# Patient Record
Sex: FEMALE | Race: WHITE | Employment: UNEMPLOYED | URBAN - NONMETROPOLITAN AREA
[De-identification: names, ages, dates, MRNs, and addresses within clinical notes are randomized per-mention and may not be internally consistent; named-entity substitution may affect disease eponyms.]

---

## 2017-09-25 ENCOUNTER — HOSPITAL ENCOUNTER (OUTPATIENT)
Dept: GENERAL RADIOLOGY | Age: 52
Discharge: HOME | End: 2017-09-25
Payer: COMMERCIAL

## 2017-09-25 ENCOUNTER — HOSPITAL ENCOUNTER (OUTPATIENT)
Age: 52
Discharge: HOME | End: 2017-09-25
Payer: COMMERCIAL

## 2017-09-25 DIAGNOSIS — Z02.71 ENCOUNTER FOR DISABILITY DETERMINATION: ICD-10-CM

## 2017-09-25 PROCEDURE — 73502 X-RAY EXAM HIP UNI 2-3 VIEWS: CPT

## 2017-09-25 PROCEDURE — 73560 X-RAY EXAM OF KNEE 1 OR 2: CPT

## 2021-09-02 ENCOUNTER — HOSPITAL ENCOUNTER (EMERGENCY)
Age: 56
Discharge: HOME | End: 2021-09-02
Payer: COMMERCIAL

## 2021-09-02 VITALS
RESPIRATION RATE: 16 BRPM | BODY MASS INDEX: 35.08 KG/M2 | TEMPERATURE: 98.4 F | HEART RATE: 89 BPM | HEIGHT: 63 IN | OXYGEN SATURATION: 97 % | WEIGHT: 198 LBS | SYSTOLIC BLOOD PRESSURE: 189 MMHG | DIASTOLIC BLOOD PRESSURE: 87 MMHG

## 2021-09-02 DIAGNOSIS — R03.0 ELEVATED BLOOD PRESSURE READING: ICD-10-CM

## 2021-09-02 DIAGNOSIS — Z20.822 LAB TEST NEGATIVE FOR COVID-19 VIRUS: ICD-10-CM

## 2021-09-02 DIAGNOSIS — J01.90 ACUTE NON-RECURRENT SINUSITIS, UNSPECIFIED LOCATION: Primary | ICD-10-CM

## 2021-09-02 LAB — SARS-COV-2, NAA: NOT  DETECTED

## 2021-09-02 PROCEDURE — 99202 OFFICE O/P NEW SF 15 MIN: CPT | Performed by: NURSE PRACTITIONER

## 2021-09-02 PROCEDURE — 99203 OFFICE O/P NEW LOW 30 MIN: CPT

## 2021-09-02 PROCEDURE — 87635 SARS-COV-2 COVID-19 AMP PRB: CPT

## 2021-09-02 RX ORDER — DOXYCYCLINE HYCLATE 100 MG
100 TABLET ORAL 2 TIMES DAILY
Qty: 20 TABLET | Refills: 0 | Status: SHIPPED | OUTPATIENT
Start: 2021-09-02 | End: 2021-09-08 | Stop reason: ALTCHOICE

## 2021-09-02 ASSESSMENT — ENCOUNTER SYMPTOMS
NAUSEA: 0
CHEST TIGHTNESS: 0
SINUS PRESSURE: 1
EYE ITCHING: 0
EYE REDNESS: 0
SORE THROAT: 0
DIARRHEA: 0
SHORTNESS OF BREATH: 0
VOMITING: 0
COUGH: 1
ABDOMINAL PAIN: 0

## 2021-09-02 ASSESSMENT — PAIN SCALES - GENERAL: PAINLEVEL_OUTOF10: 6

## 2021-09-02 ASSESSMENT — PAIN DESCRIPTION - LOCATION: LOCATION: HEAD

## 2021-09-02 ASSESSMENT — PAIN DESCRIPTION - DESCRIPTORS: DESCRIPTORS: ACHING

## 2021-09-02 NOTE — ED PROVIDER NOTES
[address] Veronica Rush       Chief Complaint   Patient presents with    Covid Testing    Sinusitis     coguh headache right temporal fatigue      Other     loss of taste and smell. Nurses Notes reviewed and I agree except as noted in the HPI. HISTORY OF PRESENT ILLNESS   Fiorella Angela is a 61 y.o. female who presents for COVID-19 testing due to not feeling well. Started 4 days ago. REVIEW OF SYSTEMS     Review of Systems   Constitutional: Positive for fatigue. Negative for chills and fever. HENT: Positive for congestion, postnasal drip and sinus pressure. Negative for ear pain and sore throat. Loss of taste and smell   Eyes: Negative for redness and itching. Respiratory: Positive for cough (productive). Negative for chest tightness and shortness of breath. Cardiovascular: Negative for chest pain. Gastrointestinal: Negative for abdominal pain, diarrhea, nausea and vomiting. Musculoskeletal: Negative for joint swelling and myalgias. Skin: Negative for rash. Allergic/Immunologic: Negative for environmental allergies and food allergies. Neurological: Negative for dizziness and headaches. Hematological: Negative for adenopathy. PAST MEDICAL HISTORY   History reviewed. No pertinent past medical history. SURGICAL HISTORY     Patient  has a past surgical history that includes Tonsillectomy and adenoidectomy ();  section (); Cholecystectomy (); Mouth surgery; bone graft (); Femur fracture surgery (); Appendectomy (); and other surgical history (2012). CURRENT MEDICATIONS       Discharge Medication List as of 2021  1:15 PM      CONTINUE these medications which have NOT CHANGED    Details   Calcium Carbonate-Vitamin D (CALTRATE 600+D) 600-400 MG-UNIT CHEW Take 1 tablet by mouth daily. Multiple Vitamin CHEW Take 1 tablet by mouth.                ALLERGIES     Patient is is allergic to latex, penicillins, sulfa antibiotics, and tetanus toxoids. FAMILY HISTORY     Patient'sfamily history includes Stroke in her mother. SOCIAL HISTORY     Patient  reports that she has never smoked. She has never used smokeless tobacco. She reports that she does not drink alcohol and does not use drugs. PHYSICAL EXAM     ED TRIAGE VITALS  BP: (!) 189/87, Temp: 98.4 °F (36.9 °C), Pulse: 89, Resp: 16, SpO2: 97 %  Physical Exam  Vitals and nursing note reviewed. Constitutional:       General: She is not in acute distress. Appearance: Normal appearance. She is well-developed and well-groomed. HENT:      Head: Normocephalic and atraumatic. Right Ear: External ear normal.      Left Ear: External ear normal.      Nose: Congestion present. Mouth/Throat:      Lips: Pink. Mouth: Mucous membranes are moist.      Comments: PND  Eyes:      Conjunctiva/sclera: Conjunctivae normal.      Right eye: Right conjunctiva is not injected. Left eye: Left conjunctiva is not injected. Pupils: Pupils are equal.   Cardiovascular:      Rate and Rhythm: Normal rate. Heart sounds: Normal heart sounds. Pulmonary:      Effort: Pulmonary effort is normal. No respiratory distress. Breath sounds: Normal breath sounds. Musculoskeletal:      Cervical back: Normal range of motion. Skin:     General: Skin is warm and dry. Findings: No rash (on exposed surfaces). Neurological:      Mental Status: She is alert and oriented to person, place, and time. Psychiatric:         Mood and Affect: Mood normal.         Speech: Speech normal.         Behavior: Behavior is cooperative.          DIAGNOSTIC RESULTS   Labs:  Abnormal Labs Reviewed - No abnormal labs to display     IMAGING:  No orders to display     URGENT CARE COURSE:     Vitals:    09/02/21 1232   BP: (!) 189/87   Pulse: 89   Resp: 16   Temp: 98.4 °F (36.9 °C)   SpO2: 97%   Weight: 198 lb (89.8 kg)   Height: 5' 3\" (1.6 m) Medications - No data to display  PROCEDURES:  FINALIMPRESSION      1. Acute non-recurrent sinusitis, unspecified location    2. Lab test negative for COVID-19 virus    3. Elevated blood pressure reading        DISPOSITION/PLAN   DISPOSITION    Discharge      ED Course as of Sep 02 1426   Thu Sep 02, 2021   1312 SARS-CoV-2, PENG: NOT  DETECTED [HA]      ED Course User Index  [address] [facility], APRN - CNP     Physical assessment findings, diagnostic testing(s) if applicable, and vital signs reviewed with patient/patient representative. If applicable, patient/patient representative will be contacted upon receipt of final culture and sensitivity or other testing results when available. Any additions or changes to medications or changes the plan of care will be made at that time. Differential diagnosis(s) discussed with patient/patient representative. Patient is to follow-up with family care provider in 2-3 days if no improvement. Patient is to go to the emergency department if symptoms change/worsen. Patient/patient representative is aware of care plan, questions answered, verbalizes understanding and is in agreement. Printed instructions attached to after visit summary. Problem List Items Addressed This Visit     None      Visit Diagnoses     Acute non-recurrent sinusitis, unspecified location    -  Primary    Relevant Medications    doxycycline hyclate (VIBRA-TABS) 100 MG tablet    Lab test negative for COVID-19 virus        Elevated blood pressure reading              PATIENT REFERRED TO:  [facility] [place] [address],[address]  [address] [address] [facility] [address] [facility]  Schedule an appointment as soon as possible for a visit in 3 days  if you do not have a family provider, If symptoms change/worsen, go to the [address] [place] [address] Urgent Care  [address] [facility]  [phone]    as needed, If symptoms change/worsen, go to the [id number] [facility], [address] Mohamud, APRN - CNP  09/02/21 0569

## 2021-09-03 ENCOUNTER — OFFICE VISIT (OUTPATIENT)
Dept: FAMILY MEDICINE CLINIC | Age: 56
End: 2021-09-03
Payer: COMMERCIAL

## 2021-09-03 VITALS
TEMPERATURE: 97 F | DIASTOLIC BLOOD PRESSURE: 120 MMHG | RESPIRATION RATE: 16 BRPM | HEIGHT: 63 IN | BODY MASS INDEX: 35.08 KG/M2 | SYSTOLIC BLOOD PRESSURE: 180 MMHG | WEIGHT: 198 LBS | OXYGEN SATURATION: 99 % | HEART RATE: 72 BPM

## 2021-09-03 DIAGNOSIS — I10 ESSENTIAL HYPERTENSION: ICD-10-CM

## 2021-09-03 DIAGNOSIS — J01.00 ACUTE NON-RECURRENT MAXILLARY SINUSITIS: Primary | ICD-10-CM

## 2021-09-03 PROCEDURE — 3017F COLORECTAL CA SCREEN DOC REV: CPT | Performed by: STUDENT IN AN ORGANIZED HEALTH CARE EDUCATION/TRAINING PROGRAM

## 2021-09-03 PROCEDURE — 1036F TOBACCO NON-USER: CPT | Performed by: STUDENT IN AN ORGANIZED HEALTH CARE EDUCATION/TRAINING PROGRAM

## 2021-09-03 PROCEDURE — G8417 CALC BMI ABV UP PARAM F/U: HCPCS | Performed by: STUDENT IN AN ORGANIZED HEALTH CARE EDUCATION/TRAINING PROGRAM

## 2021-09-03 PROCEDURE — G8427 DOCREV CUR MEDS BY ELIG CLIN: HCPCS | Performed by: STUDENT IN AN ORGANIZED HEALTH CARE EDUCATION/TRAINING PROGRAM

## 2021-09-03 PROCEDURE — 99204 OFFICE O/P NEW MOD 45 MIN: CPT | Performed by: STUDENT IN AN ORGANIZED HEALTH CARE EDUCATION/TRAINING PROGRAM

## 2021-09-03 RX ORDER — BLOOD PRESSURE TEST KIT
KIT MISCELLANEOUS
Qty: 1 KIT | Refills: 0 | Status: SHIPPED | OUTPATIENT
Start: 2021-09-03

## 2021-09-03 SDOH — ECONOMIC STABILITY: FOOD INSECURITY: WITHIN THE PAST 12 MONTHS, YOU WORRIED THAT YOUR FOOD WOULD RUN OUT BEFORE YOU GOT MONEY TO BUY MORE.: SOMETIMES TRUE

## 2021-09-03 SDOH — ECONOMIC STABILITY: FOOD INSECURITY: WITHIN THE PAST 12 MONTHS, THE FOOD YOU BOUGHT JUST DIDN'T LAST AND YOU DIDN'T HAVE MONEY TO GET MORE.: SOMETIMES TRUE

## 2021-09-03 ASSESSMENT — ENCOUNTER SYMPTOMS
NAUSEA: 1
EYE DISCHARGE: 0
COUGH: 1
EYE ITCHING: 0
BACK PAIN: 0
SINUS PAIN: 1
EYE PAIN: 0
SHORTNESS OF BREATH: 0
ABDOMINAL PAIN: 0
SORE THROAT: 0
RHINORRHEA: 1
VOMITING: 0

## 2021-09-03 ASSESSMENT — PATIENT HEALTH QUESTIONNAIRE - PHQ9
SUM OF ALL RESPONSES TO PHQ QUESTIONS 1-9: 0
SUM OF ALL RESPONSES TO PHQ9 QUESTIONS 1 & 2: 0
SUM OF ALL RESPONSES TO PHQ QUESTIONS 1-9: 0
SUM OF ALL RESPONSES TO PHQ QUESTIONS 1-9: 0
2. FEELING DOWN, DEPRESSED OR HOPELESS: 0
1. LITTLE INTEREST OR PLEASURE IN DOING THINGS: 0

## 2021-09-03 ASSESSMENT — SOCIAL DETERMINANTS OF HEALTH (SDOH): HOW HARD IS IT FOR YOU TO PAY FOR THE VERY BASICS LIKE FOOD, HOUSING, MEDICAL CARE, AND HEATING?: VERY HARD

## 2021-09-03 NOTE — PATIENT INSTRUCTIONS
Sinusitis: supportive treatment with plenty fluids 4-5 bottles of water daily. Motrin/tylenol as need for fever and headaches. Try to stay cool, avoid excess heat. Try to avoid dairy if you can. Try to avoid coffee. Should continue to improve over the next few days. Call if you develop fevers over 101.4, changes in sputum, symptoms last longer than 10 days. Elevated blood pressure: Check blood pressures twice a day for the next two week. Bring to next visit. Diet: work on cutting back on the snacks. Cut back on salt. Thank you   1. Thank you for trusting us with your healthcare needs. You may receive a survey regarding today's visit. It would help us out if you would take a few moments to provide your feedback. We value your input. 2. Please bring in ALL medications BOTTLES, including inhalers, herbal supplements, over the counter, prescribed & non-prescribed medicine. The office would like actual medication bottles and a list.   3. Please note our OFFICE POLICIES:   a. Prior to getting your labs drawn, please check with your insurance company for benefits and eligibility of lab services. Often, insurance companies cover certain tests for preventative visits only. It is patient's responsibility to see what is covered. b. We are unable to change a diagnosis after the test has been performed. c. Lab orders will not be re-printed. Please hold onto your original lab orders and take them to your lab to be completed. d. If you no show your scheduled appointment three times, you will be dismissed from this practice. e. Shereen Bernheim must be completed 24 hours prior to your schedule appointment. 4. If the list below has been completed, PLEASE FAX RECORDS TO OUR OFFICE @ [phone].  Once the records have been received we will update your records at our office:  Health Maintenance Due   Topic Date Due    Hepatitis C screen  Never done    COVID-19 Vaccine (1) Never done    HIV screen  Never done   [facility] Cervical cancer screen  Never done    Lipid screen  Never done    Diabetes screen  Never done    Colon cancer screen colonoscopy  Never done    Breast cancer screen  Never done    Shingles Vaccine (1 of 2) Never done    Flu vaccine (1) Never done             Patient Education        Sinusitis: Care Instructions  Your Care Instructions     Sinusitis is an infection of the lining of the sinus cavities in your head. Sinusitis often follows a cold. It causes pain and pressure in your head and face. In most cases, sinusitis gets better on its own in 1 to 2 weeks. But some mild symptoms may last for several weeks. Sometimes antibiotics are needed. Follow-up care is a key part of your treatment and safety. Be sure to make and go to all appointments, and call your doctor if you are having problems. It's also a good idea to know your test results and keep a list of the medicines you take. How can you care for yourself at home? · Take an over-the-counter pain medicine, such as acetaminophen (Tylenol), ibuprofen (Advil, Motrin), or naproxen (Aleve). Read and follow all instructions on the label. · If the doctor prescribed antibiotics, take them as directed. Do not stop taking them just because you feel better. You need to take the full course of antibiotics. · Be careful when taking over-the-counter cold or flu medicines and Tylenol at the same time. Many of these medicines have acetaminophen, which is Tylenol. Read the labels to make sure that you are not taking more than the recommended dose. Too much acetaminophen (Tylenol) can be harmful. · Breathe warm, moist air from a steamy shower, a hot bath, or a sink filled with hot water. Avoid cold, dry air. Using a humidifier in your home may help. Follow the directions for cleaning the machine. · Use saline (saltwater) nasal washes. This can help keep your nasal passages open and wash out mucus and bacteria.  You can buy saline nose drops at a grocery store or drugstore. Or you can make your own at home by adding 1 teaspoon of salt and 1 teaspoon of baking soda to 2 cups of distilled water. If you make your own, fill a bulb syringe with the solution, insert the tip into your nostril, and squeeze gently. Raynell Bevel your nose. · Put a hot, wet towel or a warm gel pack on your face 3 or 4 times a day for 5 to 10 minutes each time. · Try a decongestant nasal spray like oxymetazoline (Afrin). Do not use it for more than 3 days in a row. Using it for more than 3 days can make your congestion worse. When should you call for help? Call your doctor now or seek immediate medical care if:    · You have new or worse swelling or redness in your face or around your eyes.     · You have a new or higher fever. Watch closely for changes in your health, and be sure to contact your doctor if:    · You have new or worse facial pain.     · The mucus from your nose becomes thicker (like pus) or has new blood in it.     · You are not getting better as expected. Where can you learn more? Go to https://its learning.[employer].org. org and sign in to your Turn account. Enter C317 in the [employer] box to learn more about \"Sinusitis: Care Instructions. \"     If you do not have an account, please click on the \"Sign Up Now\" link. Current as of: December 2, 2020               Content Version: 12.9  © 2006-2021 [employer]wise, Incorporated. Care instructions adapted under license by [facility] ([facility]). If you have questions about a medical condition or this instruction, always ask your healthcare professional. [place] [address] any warranty or liability for your use of this information.

## 2021-09-03 NOTE — PROGRESS NOTES
S: 61 y.o. female with   Chief Complaint   Patient presents with   [place] ED Follow-up     Establish PCP and [facility] 09/02/2021 Acute non-recurrent sinusitis Follow-up        HPI: please see resident note for HPI and ROS. BP Readings from Last 3 Encounters:   09/03/21 (!) 180/120   09/02/21 (!) 189/87   09/21/12 138/64     Wt Readings from Last 3 Encounters:   09/03/21 198 lb (89.8 kg)   09/02/21 198 lb (89.8 kg)   09/21/12 204 lb (92.5 kg)       O: VS:  height is 5' 3\" (1.6 m) and weight is 198 lb (89.8 kg). Her temporal temperature is 97 °F (36.1 °C). Her blood pressure is 180/120 (abnormal) and her pulse is 72. Her respiration is 16 and oxygen saturation is 99%. AAO/NAD, appropriate affect for mood       Diagnosis Orders   1. Acute sinusitis, recurrence not specified, unspecified location     2. Essential hypertension       Plan:  OMT today. Hold off on starting doxy. Monitor BP at home. F/u 1 month for HTN. Health Maintenance Due   Topic Date Due    Hepatitis C screen  Never done    COVID-19 Vaccine (1) Never done    HIV screen  Never done    Cervical cancer screen  Never done    Lipid screen  Never done    Diabetes screen  Never done    Colon cancer screen colonoscopy  Never done    Breast cancer screen  Never done    Shingles Vaccine (1 of 2) Never done    Flu vaccine (1) Never done       Attending Physician Statement  I have discussed the case, including pertinent history and exam findings with the resident. I also have seen the patient and performed key portions of the examination. I agree with the documented assessment and plan as documented by the resident.         Gabrielle Malik DO 9/3/2021 11:33 AM

## 2021-09-03 NOTE — PROGRESS NOTES
Health Maintenance Due   Topic Date Due    Hepatitis C screen  Never done Declined    COVID-19 Vaccine (1) Never done Declined    HIV screen  Never done Declined    Cervical cancer screen  Never done    Lipid screen  Never done    Diabetes screen  Never done    Colon cancer screen colonoscopy  Never done    Breast cancer screen  Never done Declined    Shingles Vaccine (1 of 2) Never done    Flu vaccine (1) Never done

## 2021-09-03 NOTE — PROGRESS NOTES
Pedro Bazan (:  1965) is a 61 y.o. female,New patient, here for evaluation of the following chief complaint(s):  ED Follow-up (Establish PCP and [facility] 2021 Acute non-recurrent sinusitis Follow-up )         ASSESSMENT/PLAN:  1. Acute non-recurrent maxillary sinusitis  2. Essential hypertension  -     Blood Pressure Monitor KIT; Disp-1 kit, R-0, NormalCheck blood pressures twice a day. Sit and relax for 30mins prior to checking. Make sure arm is at heart level when checking. Plan  -Symptoms likely related to viral sinusitis. Per patient symptoms are starting to improve. Patient does not want to take the doxycycline, which I agree with. At this time does not appear to need antibiotics. Symptoms likely to continue to improve and resolve in 7 to 10 days. Discussed with patient if she develops any changes for worsening in sputum and nasal discharge color, fevers greater than 100.4, neck pain, difficulty swallowing, difficulty breathing, and/or has symptoms lasting longer than 10 days that she may need antibiotics. Discussed patient to call if she develops any of these and a prescription will be sent to her pharmacy for azithromycin.   -For now advised patient to continue with supportive care including pushing plenty of fluids. Advised patient to increase water intake to 4-5 bottles a day as opposed to 1 bottle, can try Gatorade can try soups can try fruit juices. Recommend that the patient avoid dairy products and coffee. Recommended patient stay and well conditioned environments, and avoid excessive heat. -Discussed with patient about her blood pressure. Blood pressure today elevated 180/110. Was also elevated at the emergency department. Patient states that she has whitecoat syndrome and that her blood pressures typically run within normal range at home.   She does state that she has been under a lot more stress or past couple of months while taking care of her demented mother. Not open to starting medication at this time. Gave patient a blood pressure log and advised patient to record her blood pressure readings twice a day for the next 2 weeks taking 30 minutes to relax and sitting in a chair upright with her arm at heart level for her blood pressure readings to be more accurate. We will go over her log and reassess at her next visit in 1 month. She is agreeable with this plan. -In the meantime I did advise that the patient cut back on salt, fast foods, snacks, carbonated drinks, as well as processed foods. Recommended that the patient increase her intake of fruits and vegetables and exercise. Return in about 4 weeks (around 10/1/2021) for blood pressure . Subjective   SUBJECTIVE/OBJECTIVE:  HPI    Here for ED follow up. Seen in ED 09/02. Was diagnosed with acute sinusitis. Had 4-day history of cough with green sputum production, rhinorrhea, right temproal headache, fatigue. Had loss of taste and smell. Covid test was negative. Was started on doxy, but she is not taking due to side effect profile, sun sensitivity, and diet restrictions. Symptoms improving. Hasn't tried anything for it. Hx of sinusitis as a kid. Only drinks one bottle of water daily. Mother had similar symptoms last week, better now. Review of Systems   Constitutional: Negative for chills and fatigue. HENT: Positive for congestion, rhinorrhea and sinus pain. Negative for ear discharge, ear pain and sore throat. Eyes: Negative for pain, discharge and itching. Respiratory: Positive for cough. Negative for shortness of breath. Cardiovascular: Negative for chest pain and palpitations. Gastrointestinal: Positive for nausea. Negative for abdominal pain and vomiting. Genitourinary: Negative for difficulty urinating and dysuria. Musculoskeletal: Negative for back pain, myalgias, neck pain and neck stiffness. Skin: Negative for rash and wound. Neurological: Positive for headaches. Hematological: Negative for adenopathy. Objective   Physical Exam  Vitals and nursing note reviewed. Constitutional:       General: She is not in acute distress. Appearance: Normal appearance. She is not ill-appearing. HENT:      Head: Normocephalic and atraumatic. Salivary Glands: Right salivary gland is not diffusely enlarged. Left salivary gland is not diffusely enlarged. Comments: Tenderness to palpation over bilateral maxillary sinuses. No tenderness of the frontal sinuses. Right Ear: Tympanic membrane, ear canal and external ear normal.      Left Ear: Tympanic membrane, ear canal and external ear normal.      Nose: Nose normal. No congestion or rhinorrhea. Mouth/Throat:      Mouth: Mucous membranes are moist.      Pharynx: No oropharyngeal exudate or posterior oropharyngeal erythema. Eyes:      Extraocular Movements: Extraocular movements intact. Conjunctiva/sclera: Conjunctivae normal.   Cardiovascular:      Rate and Rhythm: Normal rate and regular rhythm. Pulses: Normal pulses. Heart sounds: Normal heart sounds. No murmur heard. Pulmonary:      Effort: Pulmonary effort is normal. No respiratory distress. Breath sounds: Normal breath sounds. No wheezing. Abdominal:      General: Abdomen is flat. Palpations: Abdomen is soft. There is no mass. Tenderness: There is no abdominal tenderness. Musculoskeletal:         General: No swelling or tenderness. Normal range of motion. Cervical back: Normal range of motion and neck supple. No rigidity or tenderness. Lymphadenopathy:      Cervical: No cervical adenopathy. Skin:     General: Skin is warm and dry. Neurological:      General: No focal deficit present. Mental Status: She is alert and oriented to person, place, and time.    Psychiatric:         Mood and Affect: Mood normal.         Behavior: Behavior normal.              An electronic signature was used to authenticate this note.     --Ira Herr MD

## 2021-09-08 ENCOUNTER — OFFICE VISIT (OUTPATIENT)
Dept: FAMILY MEDICINE CLINIC | Age: 56
End: 2021-09-08
Payer: COMMERCIAL

## 2021-09-08 ENCOUNTER — TELEPHONE (OUTPATIENT)
Dept: ADMINISTRATIVE | Age: 56
End: 2021-09-08

## 2021-09-08 VITALS
BODY MASS INDEX: 34.66 KG/M2 | DIASTOLIC BLOOD PRESSURE: 100 MMHG | SYSTOLIC BLOOD PRESSURE: 140 MMHG | TEMPERATURE: 96.6 F | RESPIRATION RATE: 12 BRPM | HEART RATE: 77 BPM | HEIGHT: 63 IN | WEIGHT: 195.6 LBS | OXYGEN SATURATION: 99 %

## 2021-09-08 DIAGNOSIS — H10.31 ACUTE BACTERIAL CONJUNCTIVITIS OF RIGHT EYE: ICD-10-CM

## 2021-09-08 DIAGNOSIS — B96.89 ACUTE BACTERIAL SINUSITIS: Primary | ICD-10-CM

## 2021-09-08 DIAGNOSIS — J01.90 ACUTE BACTERIAL SINUSITIS: Primary | ICD-10-CM

## 2021-09-08 DIAGNOSIS — R05.9 COUGH: ICD-10-CM

## 2021-09-08 PROCEDURE — 99213 OFFICE O/P EST LOW 20 MIN: CPT | Performed by: STUDENT IN AN ORGANIZED HEALTH CARE EDUCATION/TRAINING PROGRAM

## 2021-09-08 PROCEDURE — 1036F TOBACCO NON-USER: CPT | Performed by: STUDENT IN AN ORGANIZED HEALTH CARE EDUCATION/TRAINING PROGRAM

## 2021-09-08 PROCEDURE — G8427 DOCREV CUR MEDS BY ELIG CLIN: HCPCS | Performed by: STUDENT IN AN ORGANIZED HEALTH CARE EDUCATION/TRAINING PROGRAM

## 2021-09-08 PROCEDURE — 3017F COLORECTAL CA SCREEN DOC REV: CPT | Performed by: STUDENT IN AN ORGANIZED HEALTH CARE EDUCATION/TRAINING PROGRAM

## 2021-09-08 PROCEDURE — G8417 CALC BMI ABV UP PARAM F/U: HCPCS | Performed by: STUDENT IN AN ORGANIZED HEALTH CARE EDUCATION/TRAINING PROGRAM

## 2021-09-08 RX ORDER — MOXIFLOXACIN 5 MG/ML
1 SOLUTION/ DROPS OPHTHALMIC 3 TIMES DAILY
Qty: 3 ML | Refills: 0 | Status: SHIPPED | OUTPATIENT
Start: 2021-09-08 | End: 2021-09-15

## 2021-09-08 RX ORDER — LEVOFLOXACIN 750 MG/1
750 TABLET ORAL DAILY
Qty: 5 TABLET | Refills: 0 | Status: SHIPPED | OUTPATIENT
Start: 2021-09-08 | End: 2021-09-13

## 2021-09-08 NOTE — PROGRESS NOTES
KURTIS Camargo is a 61 y. o.female    Chief Complaint   Patient presents with    Eye Drainage     Right eye redness, green drainage, and itching started this morning and also sore throat       Chief complaint, [place], and all pertinent details of the case reviewed with the resident. Please see resident's note for specific details discussed at today's visit. There is no problem list on file for this patient. Current Outpatient Medications   Medication Sig Dispense Refill    Dextromethorphan HBr 10 MG/15ML SYRP Take 15 mLs by mouth every 4 hours as needed (cough) 120 mL 0    levoFLOXacin (LEVAQUIN) 750 MG tablet Take 1 tablet by mouth daily for 5 days 5 tablet 0    moxifloxacin (VIGAMOX) 0.5 % ophthalmic solution Place 1 drop into both eyes 3 times daily for 7 days 3 mL 0    Blood Pressure Monitor KIT Check blood pressures twice a day. Sit and relax for 30mins prior to checking. Make sure arm is at heart level when checking. 1 kit 0    Calcium Carbonate-Vitamin D (CALTRATE 600+D) 600-400 MG-UNIT CHEW Take 1 tablet by mouth daily. No current facility-administered medications for this visit. Review of Systems per Dr. Brittany Marrero     BP (!) 140/100 (Site: Right Upper Arm, Position: Sitting, Cuff Size: Medium Adult)   Pulse 77   Temp 96.6 °F (35.9 °C) (Temporal)   Resp 12   Ht 5' 3\" (1.6 m)   Wt 195 lb 9.6 oz (88.7 kg)   SpO2 99%   BMI 34.65 kg/m²   BP Readings from Last 3 Encounters:   09/08/21 (!) 140/100   09/03/21 (!) 180/120   09/02/21 (!) 189/87       Wt Readings from Last 3 Encounters:   09/08/21 195 lb 9.6 oz (88.7 kg)   09/03/21 198 lb (89.8 kg)   09/02/21 198 lb (89.8 kg)     Body mass index is 34.65 kg/m². Physical Exam  Vitals and nursing note reviewed. Constitutional:       General: She is not in acute distress. Appearance: Normal appearance. She is normal weight. She is not ill-appearing, toxic-appearing or diaphoretic.    HENT: Head: Normocephalic and atraumatic. Nose: Nose normal.   Eyes:      General: No scleral icterus. Right eye: No discharge. Left eye: No discharge. Extraocular Movements: Extraocular movements intact. Conjunctiva/sclera: Conjunctivae normal.      Pupils: Pupils are equal, round, and reactive to light. Neck:      Vascular: No carotid bruit. Cardiovascular:      Rate and Rhythm: Normal rate and regular rhythm. Heart sounds: Normal heart sounds. No murmur heard. No friction rub. No gallop. Pulmonary:      Effort: Pulmonary effort is normal. No respiratory distress. Breath sounds: Normal breath sounds. No stridor. No wheezing, rhonchi or rales. Abdominal:      General: Abdomen is flat. Bowel sounds are normal. There is no distension. Palpations: Abdomen is soft. There is no mass. Tenderness: There is no abdominal tenderness. There is no guarding or rebound. Hernia: No hernia is present. Musculoskeletal:         General: No swelling or signs of injury. Normal range of motion. Cervical back: Normal range of motion. Right lower leg: No edema. Left lower leg: No edema. Skin:     General: Skin is warm and dry. Coloration: Skin is not jaundiced or pale. Findings: No bruising, erythema, lesion or rash. Neurological:      General: No focal deficit present. Mental Status: She is alert and oriented to person, place, and time. Psychiatric:         Mood and Affect: Mood normal.         Behavior: Behavior normal.         Thought Content: Thought content normal.         Judgment: Judgment normal.              There is no immunization history on file for this patient.     Health Maintenance   Topic Date Due    Hepatitis C screen  Never done    COVID-19 Vaccine (1) Never done    HIV screen  Never done    Cervical cancer screen  Never done    Lipid screen  Never done    Diabetes screen  Never done    Colon cancer screen colonoscopy  Never done    Breast cancer screen  Never done    Shingles Vaccine (1 of 2) Never done    Flu vaccine (1) Never done    Hepatitis A vaccine  Aged Out    Hepatitis B vaccine  Aged Out    Hib vaccine  Aged Out    Meningococcal (ACWY) vaccine  Aged Out    Pneumococcal 0-64 years Vaccine  Aged Out           Future Appointments   Date Time Provider [facility]   10/4/2021 10:20 AM Ana Youngblood MD [facility]       ASSESSMENT       Diagnosis Orders   1. Acute bacterial sinusitis  levoFLOXacin (LEVAQUIN) 750 MG tablet   2. Acute bacterial conjunctivitis of right eye  moxifloxacin (VIGAMOX) 0.5 % ophthalmic solution   3. Cough  Dextromethorphan HBr 10 MG/15ML SYRP       PLAN      After discussion with pt and Dr. Jordan Key, we agreed on plan as follows:    Given patient's history of allergies as well as inability to tolerate doxycycline, will start levofloxacin 750 mg - 1 pill daily x5 days  We will also start Vigamox 1 drop 3 times daily x7 days #1 bottle/no refills  Continue symptomatic treatment otherwise  Small frequent meals with plenty of fluids  Get plenty of rest  Follow-up if no better. Attending Physician Statement  I have discussed the case, including pertinent history and exam findings with the resident. I also have seen the patient and performed key portions of the examination. I agree with the documented assessment and plan as documented by the resident.   GC modifier added to this encounter     Electronically signed by Jessica Wray MD on 9/8/2021 at 6:13 PM

## 2021-09-08 NOTE — PROGRESS NOTES
[id number] [facility] [address] [id number]  Dept: [phone]  Dept Fax: [phone]: [phone]      Assessment/Plan:     1. Acute bacterial sinusitis  Now on day 11 with worsening symptoms and unilateral conjunctivitis. Patient wanted something different than doxy due to photosensitivity and inability to take po calcium with doxy. - levaquin 750 mg x 5 days    2. Acute bacterial conjunctivitis of right eye  - Moxifloxacin 1 drop TID to both eyes for 7 days    3. Cough  - dextromethorphan 10 mg q4h prn       Return if symptoms worsen or fail to improve. Medications Prescribed:  Orders Placed This Encounter   Medications    Dextromethorphan HBr 10 MG/15ML SYRP     Sig: Take 15 mLs by mouth every 4 hours as needed (cough)     Dispense:  120 mL     Refill:  0    levoFLOXacin (LEVAQUIN) 750 MG tablet     Sig: Take 1 tablet by mouth daily for 5 days     Dispense:  5 tablet     Refill:  0    moxifloxacin (VIGAMOX) 0.5 % ophthalmic solution     Sig: Place 1 drop into both eyes 3 times daily for 7 days     Dispense:  3 mL     Refill:  0       Future Appointments   Date Time Provider [facility]   10/4/2021 10:20 AM Triston Pearson MD [facility] [address] [facility]       Patient given educational materials - see patient instructions. Discussed use, benefit, and side effects of prescribed medications. Reviewed health maintenance. Instructed to continue current medications, diet and exercise. All patient questions answered. Pt voiced understanding. Patient agreed with treatment plan. Follow up as directed.      Electronically signed by Ariadne Huertas MD on 9/8/2021 at 3:54 PM      HPI:     Elaine Young is a 61 y.o. female who presents today for:  Chief Complaint   Patient presents with    Eye Drainage     Right eye redness, green drainage, and itching started this morning and also sore throat     Was seen in ED on 9/2 for 4 day history of productive cough, runny nose, headache, and loss of taste and smell. Symptoms started on . Smell is back, but taste is not back all the way. Now has worsened sore throat and pink eye on the right. Covid test was negative in ED. Past Medical History:      History reviewed. No pertinent past medical history. Past Surgical History:          Procedure Laterality Date    APPENDECTOMY      BONE GRAFT      LEFT  KNEE     SECTION      [facility]    FEMUR FRACTURE SURGERY      MOUTH SURGERY      OTHER SURGICAL HISTORY  2012    HARDWARE REMOVAL LEFT HIP    TONSILLECTOMY AND ADEN[facility]ECTOMY      [facility]       Medications Prior to Admission:      Prior to Admission medications    Medication Sig Start Date End Date Taking? Authorizing Provider   Dextromethorphan HBr 10 MG/15ML SYRP Take 15 mLs by mouth every 4 hours as needed (cough) 9/8/21 9/15/21 Yes Cathie Courtney MD   levoFLOXacin (LEVAQUIN) 750 MG tablet Take 1 tablet by mouth daily for 5 days 21 Yes Cathie Courtney MD   moxifloxacin (VIGAMOX) 0.5 % ophthalmic solution Place 1 drop into both eyes 3 times daily for 7 days 9/8/21 9/15/21 Yes Cathie Courtney MD   Blood Pressure Monitor KIT Check blood pressures twice a day. Sit and relax for 30mins prior to checking. Make sure arm is at heart level when checking. 9/3/21  Yes Aleshia Covarrubias MD   Calcium Carbonate-Vitamin D (CALTRATE 600+D) 600-400 MG-UNIT CHEW Take 1 tablet by mouth daily. Yes Historical Provider, MD       Allergies:  Latex, Penicillins, Other, Sulfa antibiotics, and Tetanus toxoids    Social History:      TOBACCO:   reports that she has never smoked. She has never used smokeless tobacco.  ETOH:   reports no history of alcohol use.     Family History:          Problem Relation Age of Onset    Stroke Mother     High Blood Pressure Mother     Thyroid Disease Mother     Heart Disease Father         CHF and also smoker    Heart Attack Father     High Blood Pressure Sister     High Blood Pressure Sister        Objective:     Vitals:    09/08/21 1411 09/08/21 1417   BP: (!) 150/102 (!) 140/100   Site: Left Upper Arm Right Upper Arm   Position: Sitting Sitting   Cuff Size: Medium Adult Medium Adult   Pulse: 77    Resp: 12    Temp: 96.6 °F (35.9 °C)    TempSrc: Temporal    SpO2: 99%    Weight: 195 lb 9.6 oz (88.7 kg)    Height: 5' 3\" (1.6 m)        Physical Exam:  General appearance: Alert, not in acute distress  HEENT:  Injected conjunctiva of the right eye with clear drainage. Erythematous oropharynx without exudates. Patient had a tonsillectomy. Neck: Supple, with full range of motion. Respiratory:  Normal respiratory effort. Clear to auscultation, bilaterally without Rales/Wheezes/Rhonchi. Cardiovascular: Normal rate, regular rhythm with normal S1/S2 without murmurs, rubs or gallops. Abdomen: Soft, non-tender, non-distended with normal bowel sounds. Skin: No rashes or lesions.

## 2021-10-04 ENCOUNTER — HOSPITAL ENCOUNTER (OUTPATIENT)
Age: 56
Discharge: HOME | End: 2021-10-04
Payer: COMMERCIAL

## 2021-10-04 ENCOUNTER — TELEPHONE (OUTPATIENT)
Dept: FAMILY MEDICINE CLINIC | Age: 56
End: 2021-10-04

## 2021-10-04 ENCOUNTER — OFFICE VISIT (OUTPATIENT)
Dept: FAMILY MEDICINE CLINIC | Age: 56
End: 2021-10-04
Payer: COMMERCIAL

## 2021-10-04 ENCOUNTER — HOSPITAL ENCOUNTER (OUTPATIENT)
Dept: GENERAL RADIOLOGY | Age: 56
Discharge: HOME | End: 2021-10-04
Payer: COMMERCIAL

## 2021-10-04 VITALS
DIASTOLIC BLOOD PRESSURE: 100 MMHG | HEIGHT: 63 IN | HEART RATE: 91 BPM | SYSTOLIC BLOOD PRESSURE: 160 MMHG | BODY MASS INDEX: 34.2 KG/M2 | TEMPERATURE: 97.1 F | OXYGEN SATURATION: 96 % | WEIGHT: 193 LBS

## 2021-10-04 DIAGNOSIS — R05.9 COUGH: ICD-10-CM

## 2021-10-04 DIAGNOSIS — I10 ESSENTIAL HYPERTENSION: Primary | ICD-10-CM

## 2021-10-04 DIAGNOSIS — J32.9 RECURRENT SINUSITIS: ICD-10-CM

## 2021-10-04 PROCEDURE — 71046 X-RAY EXAM CHEST 2 VIEWS: CPT

## 2021-10-04 PROCEDURE — G8417 CALC BMI ABV UP PARAM F/U: HCPCS | Performed by: STUDENT IN AN ORGANIZED HEALTH CARE EDUCATION/TRAINING PROGRAM

## 2021-10-04 PROCEDURE — 3017F COLORECTAL CA SCREEN DOC REV: CPT | Performed by: STUDENT IN AN ORGANIZED HEALTH CARE EDUCATION/TRAINING PROGRAM

## 2021-10-04 PROCEDURE — G8484 FLU IMMUNIZE NO ADMIN: HCPCS | Performed by: STUDENT IN AN ORGANIZED HEALTH CARE EDUCATION/TRAINING PROGRAM

## 2021-10-04 PROCEDURE — 1036F TOBACCO NON-USER: CPT | Performed by: STUDENT IN AN ORGANIZED HEALTH CARE EDUCATION/TRAINING PROGRAM

## 2021-10-04 PROCEDURE — G8427 DOCREV CUR MEDS BY ELIG CLIN: HCPCS | Performed by: STUDENT IN AN ORGANIZED HEALTH CARE EDUCATION/TRAINING PROGRAM

## 2021-10-04 PROCEDURE — 99214 OFFICE O/P EST MOD 30 MIN: CPT | Performed by: STUDENT IN AN ORGANIZED HEALTH CARE EDUCATION/TRAINING PROGRAM

## 2021-10-04 RX ORDER — GUAIFENESIN 600 MG/1
600 TABLET, EXTENDED RELEASE ORAL 2 TIMES DAILY
Qty: 30 TABLET | Refills: 0 | Status: SHIPPED | OUTPATIENT
Start: 2021-10-04 | End: 2021-10-19

## 2021-10-04 RX ORDER — HYDROCHLOROTHIAZIDE 25 MG/1
25 TABLET ORAL EVERY MORNING
Qty: 30 TABLET | Refills: 1 | Status: SHIPPED | OUTPATIENT
Start: 2021-10-04 | End: 2021-11-03 | Stop reason: ALTCHOICE

## 2021-10-04 RX ORDER — FLUTICASONE PROPIONATE 50 MCG
1 SPRAY, SUSPENSION (ML) NASAL DAILY
Qty: 32 G | Refills: 1 | Status: SHIPPED | OUTPATIENT
Start: 2021-10-04 | End: 2021-11-03 | Stop reason: ALTCHOICE

## 2021-10-04 RX ORDER — MULTIVIT WITH MINERALS/LUTEIN
125 TABLET ORAL DAILY
COMMUNITY

## 2021-10-04 NOTE — PROGRESS NOTES
**This is a Medical/ PA/ APRN Student Note and is charted for educational purposes. The non-physician staff attested note is not to be used for billing purposes or to guide patient care. Please see the physician modifications/ attestation for treatment plan/suggestions. This note has been reviewed and feedback has been provided to the student. *      Medicine Progress Note    Patient:  Milvia Hairston    Unit/Bed:Room/bed info not found  YOB: 1965  MRN: [id number]   Acct:    PCP: Luann Crain MD  Date of Admission: (Not on file)    Assessment / Plan     1. Hypertension  Log was completed for 1 month in AM and PM which demonstrated BP averaging 140's/90's. Plan:   - HCTZ 25mg in AM  - Continue BP log to review at next appointment  - F/U 1 month    2. Sinusitis/Cough   Second sinus infection in past month w/ hx of bronchitis. Plan:   - Flonase 50mcg/act  - Chest XR  - Recommended saline nasal washes   - OMT - performed bilateral sinus effleurage and suboccipital release to improve lymphatic drainage from sinuses and relieve sinus pressure. - F/U 1 month to check improvement; if minimal improvement, order orbital CT. Subjective     Ms. Rina Luna is a 61year old female that presents to the clinic today for a 1 month follow up for management of hypertension and sinusitis. She was seen by Dr. Felicitas Curry on 9/3/2021 after ED visit for sinusitis and recommended increased fluid intake and tylenol for pain. Her symptoms did not resolve after appointment and was seen by Dr. Orestes James on 9/8/2021 with sinusitis concerns and right eye conjunctivitis and green drainage. She was prescribed levofloxacin but did not take it. She did take Mucinex and showed improvement but symptoms returned 8 days ago after visiting her grandchildren. She appreciates bilateral frontal and maxillary sinus tenderness, cough that awakens her at night, and postnasal drip.  She denies fever, abdominal pain, nausea, vomiting, chest pain, palpitations and shortness of breath. She noted increased relief from OMT sinus procedure performed at prior visit. Objective     Vitals:     BP (!) 160/100 (Site: Right Upper Arm, Position: Sitting, Cuff Size: Medium Adult)   Pulse 91   Temp 97.1 °F (36.2 °C) (Skin) Comment (Src): 10/4/21 , wrist.KB  Ht 5' 3\" (1.6 m)   Wt 193 lb (87.5 kg)   SpO2 96%   BMI 34.19 kg/m²       Medications:     Scheduled Meds:  Continuous Infusions:  PRN Meds:    Physical Exam:     Physical Exam  CV: RRR, no murmurs, no gallops  RESP: Wheezing and rhonchi appreciated in bilateral upper and lower posterior poles. MSK: Osteopathic structural exam performed which demonstrated bilateral maxillary and frontal sinus tenderness and bilateral cervical hypertonicity. Risks and benefits of OMT were discussed with patient and agreed to treatment. Bilateral sinus effleurage was performed with moderate improvement of tenderness and noted improved drainage during procedure. Suboccipital release was performed and improved tenderness and range of motion was appreciated after completion of technique. Signed:  Malissa Caba  IOMS-III  10/4/2021  1:24 PM    Staff: Dr. Adrian Heck  PGY-2: Family Medicine     **This is a Medical/ PA/ APRN Student Note and is charted for educational purposes. The non-physician staff attested note is not to be used for billing purposes or to guide patient care. Please see the physician modifications/ attestation for treatment plan/suggestions. This note has been reviewed and feedback has been provided to the student.  *

## 2021-10-04 NOTE — PROGRESS NOTES
S: 61 y.o. female with   Chief Complaint   Patient presents with    Follow-up     blood pressure check, has a log, patient states anxiety, cares for her mother    Other     OMT appt.; patient c/o sinus problems, coughing, sinus pressure, beginning 1 week ago, mucinex     HTN -- uncontrolled in log. She has been encouraged to follow healthy diet. High stress level. H/o ongoing sinusitis issues. Doxycycline tried but had side effect. Most recent episode had OMT, mucinex. , with improvement   Recently exposure to children with illness. Now she has congestion again. BP Readings from Last 3 Encounters:   10/04/21 (!) 160/100   09/08/21 (!) 140/100   09/03/21 (!) 180/120     Wt Readings from Last 3 Encounters:   10/04/21 193 lb (87.5 kg)   09/08/21 195 lb 9.6 oz (88.7 kg)   09/03/21 198 lb (89.8 kg)           O: VS:   Vitals:    10/04/21 1031 10/04/21 1032   BP: (!) 140/90 (!) 160/100   Site: Left Upper Arm Right Upper Arm   Position: Sitting Sitting   Cuff Size: Medium Adult Medium Adult   Pulse: 91    Temp: 97.1 °F (36.2 °C)    TempSrc: Skin    SpO2: 96%    Weight: 193 lb (87.5 kg)    Height: 5' 3\" (1.6 m)      Body mass index is 34.19 kg/m². AAO/NAD, appropriate affect for mood  Normocephalic, atraumatic, eyes  conjunctiva and sclera normal,   skin no rashes on exposed areas   Insight, judgement normal and in no acute distress    No results found for: WBC, HGB, HCT, PLT, CHOL, TRIG, HDL, LDLDIRECT, LDLCALC, AST, NA, K, CL, CREATININE, BUN, CO2, TSH, PSA, INR, GLUF, LABA1C, LABMICR, LABGLOM, MG, CALCIUM, VITD25    No results found. Diagnosis Orders   1. Essential hypertension  hydroCHLOROthiazide (HYDRODIURIL) 25 MG tablet   2. Recurrent sinusitis  fluticasone (FLONASE) 50 MCG/ACT nasal spray    guaiFENesin (MUCINEX) 600 MG extended release tablet   3.  Cough  XR CHEST STANDARD (2 VW)       Plan    Education provided on treatment for HTN  Encouraged healthy diet and exercise  Sinus troubles -- failed antibiotic x 1. Not wanting 2nd round but with moderate symptoms. -- accepted OMT and will do nasal saline spray/flush then use flonase  She knows to call if not improving or worsening         Return in about 4 weeks (around 2021) for Blood pressure . Orders Placed:  Orders Placed This Encounter   Procedures    XR CHEST STANDARD (2 VW)     Medications Prescribed:  Orders Placed This Encounter   Medications    fluticasone (FLONASE) 50 MCG/ACT nasal spray     Si spray by Each Nostril route daily     Dispense:  32 g     Refill:  1    guaiFENesin (MUCINEX) 600 MG extended release tablet     Sig: Take 1 tablet by mouth 2 times daily for 15 days     Dispense:  30 tablet     Refill:  0    hydroCHLOROthiazide (HYDRODIURIL) 25 MG tablet     Sig: Take 1 tablet by mouth every morning     Dispense:  30 tablet     Refill:  1       Future Appointments   Date Time Provider Flor Zavala   11/3/2021  1:00 PM Sam Mcconnell MD [facility] Maintenance Due   Topic Date Due    Potassium monitoring  Never done    Creatinine monitoring  Never done    Hepatitis C screen  Never done    COVID-19 Vaccine (1) Never done    HIV screen  Never done    Cervical cancer screen  Never done    Lipid screen  Never done    Diabetes screen  Never done    Colon cancer screen colonoscopy  Never done    Breast cancer screen  Never done    Shingles Vaccine (1 of 2) Never done    Flu vaccine (1) Never done         Attending Physician Statement  I have discussed the case, including pertinent history and exam findings with the resident. I also have seen the patient and performed key portions of the examination. I agree with the documented assessment and plan as documented by the resident.   GE modifier added to this encounter      Jena Cooper MD 10/4/2021 8:36 PM

## 2021-10-04 NOTE — PROGRESS NOTES
Nii Li (:  1965) is a 61 y.o. female,Established patient, here for evaluation of the following chief complaint(s):  Follow-up (blood pressure check, has a log, patient states anxiety, cares for her mother) and Other (OMT appt.; patient c/o sinus problems, coughing, sinus pressure, beginning 1 week ago, mucinex)         ASSESSMENT/PLAN:  1. Essential hypertension  -     hydroCHLOROthiazide (HYDRODIURIL) 25 MG tablet; Take 1 tablet by mouth every morning, Disp-30 tablet, R-1Normal  2. Recurrent sinusitis  -     fluticasone (FLONASE) 50 MCG/ACT nasal spray; 1 spray by Each Nostril route daily, Disp-32 g, R-1Normal  -     guaiFENesin (MUCINEX) 600 MG extended release tablet; Take 1 tablet by mouth 2 times daily for 15 days, Disp-30 tablet, R-0Normal  3. Cough  -     XR CHEST STANDARD (2 VW); Future    Plan  -Sinus rinses, Flonase, and Mucinex for now. Patient instructed to follow-up in 3 to 4 days if symptoms do not improve. At that time may need to try antibiotic treatment. If started on antibiotics and she fails treatment, the next step would be a CT scan of the sinuses. -Patient coughing, and on exam lungs sound rhonchorous with some wheezes. Will get a chest x-ray to evaluate for possible pneumonia. Will start antibiotics if necessary.  -We will start hydrochlorothiazide 25 mg once a day for blood pressure control. Will reassess in 1 month. Return in about 4 weeks (around 2021) for Blood pressure . Subjective   SUBJECTIVE/OBJECTIVE:  HPI    Here for follow up    BP: high in office. High at home. BP log showing Blood pressures of 140s-150s/80s-90 consistently. Sinusitis and Cough: Had similar symptoms 1 month ago which resolved with OMT and Mucinex. However 8 days ago she babysat her grandchild, and soon after developed similar symptoms again. Wants wants to try OMT again. No fevers or chills. No SOB, N/V, chest pain.      Review of Systems       Objective   Physical Exam  Vitals and nursing note reviewed. Constitutional:       General: She is not in acute distress. Appearance: Normal appearance. She is not ill-appearing. HENT:      Head: Normocephalic and atraumatic. Right Ear: External ear normal.      Left Ear: External ear normal.      Nose: Congestion present. Eyes:      Extraocular Movements: Extraocular movements intact. Conjunctiva/sclera: Conjunctivae normal.      Pupils: Pupils are equal, round, and reactive to light. Cardiovascular:      Rate and Rhythm: Normal rate and regular rhythm. Pulses: Normal pulses. Heart sounds: No murmur heard. Pulmonary:      Effort: Pulmonary effort is normal. No respiratory distress. Breath sounds: Wheezing and rhonchi present. Abdominal:      General: Abdomen is flat. There is no distension. Palpations: Abdomen is soft. Tenderness: There is no abdominal tenderness. Musculoskeletal:         General: Normal range of motion. Cervical back: Normal range of motion and neck supple. No muscular tenderness. Right lower leg: No edema. Left lower leg: No edema. Skin:     General: Skin is warm and dry. Capillary Refill: Capillary refill takes less than 2 seconds. Findings: No rash. Neurological:      General: No focal deficit present. Mental Status: She is alert and oriented to person, place, and time. Sensory: No sensory deficit. Gait: Gait normal.   Psychiatric:         Mood and Affect: Mood normal.         Behavior: Behavior normal.       OMT per CLIFTON AwanMS-III. Osteopathic structural exam performed which demonstrated bilateral maxillary and frontal sinus tenderness and bilateral cervical hypertonicity. Risks and benefits of OMT were discussed with patient and agreed to treatment. Bilateral sinus effleurage was performed with moderate improvement of tenderness and noted improved drainage during procedure.  Suboccipital release was performed and improved tenderness and range of motion was appreciated after completion of technique. An electronic signature was used to authenticate this note.     --Solo Alcantara MD no

## 2021-10-04 NOTE — PROGRESS NOTES
Health Maintenance Due   Topic Date Due    Hepatitis C screen  Never done    COVID-19 Vaccine (1) Never done    HIV screen  Never done    Cervical cancer screen  Never done    Lipid screen  Never done    Diabetes screen  Never done    Colon cancer screen colonoscopy  Never done    Breast cancer screen  Never done    Shingles Vaccine (1 of 2) Never done    Flu vaccine (1) Never done   Patient is here for a follow up, BP check, has a log, no medications    Patient c/o sinus problems, coughing and not sleeping well, taking mucinex-not helping; requests OMT appointment. c/o not sleeping well, anxiety, helps care for her mother-had a stroke

## 2021-10-04 NOTE — PATIENT INSTRUCTIONS
Start Hydrochlorothiazide 25mg daily  Start flonase, one spray in each nostril daily, can increase to two sprays each nostril in 2 weeks if symptoms not improved  Continue with Mucinex as needed  Can try sinus rinses were a [place] pot as needed  Call the office if symptoms do not improve in the next 3 to 4 days. Get chest x-ray done. Will call with results. Will need antibiotics if chest x-ray comes back concerning for pneumonia. Otherwise if symptoms improve follow-up in 1 month for blood pressure check. Patient Education        Saline Nasal Washes: Care Instructions  Your Care Instructions     Saline nasal washes help keep the nasal passages open by washing out thick or dried mucus. This simple remedy can help relieve symptoms of allergies, sinusitis, and colds. It also can make the nose feel more comfortable by keeping the mucous membranes moist. You may notice a little burning sensation in your nose the first few times you use the solution, but this usually gets better in a few days. Follow-up care is a key part of your treatment and safety. Be sure to make and go to all appointments, and call your doctor if you are having problems. It's also a good idea to know your test results and keep a list of the medicines you take. How can you care for yourself at home? · You can buy premixed saline solution in a squeeze bottle or other sinus rinse products at a drugstore. Read and follow the instructions on the label. · You also can make your own saline solution by adding 1 teaspoon of salt and 1 teaspoon of baking soda to 2 cups of distilled water. · If you use a homemade solution, pour a small amount into a clean bowl. Using a rubber bulb syringe, squeeze the syringe and place the tip in the salt water. Pull a small amount of the salt water into the syringe by relaxing your hand. · Sit down with your head tilted slightly back. Do not lie down.  Put the tip of the bulb syringe or the squeeze bottle a little way into one of your nostrils. Gently drip or squirt a few drops into the nostril. Repeat with the other nostril. Some sneezing and gagging are normal at first.  · Gently blow your nose. · Wipe the syringe or bottle tip clean after each use. · Repeat this 2 or 3 times a day. · Use nasal washes gently if you have nosebleeds often. When should you call for help? Watch closely for changes in your health, and be sure to contact your doctor if:    · You often get nosebleeds.     · You have problems doing the nasal washes. Where can you learn more? Go to https://Vinjapepiceweb.LearnUpon. org and sign in to your [employer] account. Enter [id number] in the [facility] box to learn more about \"Saline Nasal Washes: Care Instructions. \"     If you do not have an account, please click on the \"Sign Up Now\" link. Current as of: December 2, 2020               Content Version: 13.0  © 2006-2021 [employer]. Care instructions adapted under license by [facility] ([facility]). If you have questions about a medical condition or this instruction, always ask your healthcare professional. [place] [address] any warranty or liability for your use of this information. Patient Education        DASH Diet: Care Instructions  Your Care Instructions     The DASH diet is an eating plan that can help lower your blood pressure. DASH stands for Dietary Approaches to Stop Hypertension. Hypertension is high blood pressure. The DASH diet focuses on eating foods that are high in calcium, potassium, and magnesium. These nutrients can lower blood pressure. The foods that are highest in these nutrients are fruits, vegetables, low-fat dairy products, nuts, seeds, and legumes. But taking calcium, potassium, and magnesium supplements instead of eating foods that are high in those nutrients does not have the same effect. The DASH diet also includes whole grains, fish, and poultry.   The DASH diet is one of several lifestyle changes your doctor may recommend to lower your high blood pressure. Your doctor may also want you to decrease the amount of sodium in your diet. Lowering sodium while following the DASH diet can lower blood pressure even further than just the DASH diet alone. Follow-up care is a key part of your treatment and safety. Be sure to make and go to all appointments, and call your doctor if you are having problems. It's also a good idea to know your test results and keep a list of the medicines you take. How can you care for yourself at home? Following the DASH diet  · Eat 4 to 5 servings of fruit each day. A serving is 1 medium-sized piece of fruit, ½ cup chopped or canned fruit, 1/4 cup dried fruit, or 4 ounces (½ cup) of fruit juice. Choose fruit more often than fruit juice. · Eat 4 to 5 servings of vegetables each day. A serving is 1 cup of lettuce or raw leafy vegetables, ½ cup of chopped or cooked vegetables, or 4 ounces (½ cup) of vegetable juice. Choose vegetables more often than vegetable juice. · Get 2 to 3 servings of low-fat and fat-free dairy each day. A serving is 8 ounces of milk, 1 cup of yogurt, or 1 ½ ounces of cheese. · Eat 6 to 8 servings of grains each day. A serving is 1 slice of bread, 1 ounce of dry cereal, or ½ cup of cooked rice, pasta, or cooked cereal. Try to choose whole-grain products as much as possible. · Limit lean meat, poultry, and fish to 2 servings each day. A serving is 3 ounces, about the size of a deck of cards. · Eat 4 to 5 servings of nuts, seeds, and legumes (cooked dried beans, lentils, and split peas) each week. A serving is 1/3 cup of nuts, 2 tablespoons of seeds, or ½ cup of cooked beans or peas. · Limit fats and oils to 2 to 3 servings each day. A serving is 1 teaspoon of vegetable oil or 2 tablespoons of salad dressing. · Limit sweets and added sugars to 5 servings or less a week.  A serving is 1 tablespoon jelly or jam, ½ cup sorbet, or 1 cup of lemonade. · Eat less than 2,300 milligrams (mg) of sodium a day. If you limit your sodium to 1,500 mg a day, you can lower your blood pressure even more. · Be aware that all of these are the suggested number of servings for people who eat 1,800 to 2,000 calories a day. Your recommended number of servings may be different if you need more or fewer calories. Tips for success  · Start small. Do not try to make dramatic changes to your diet all at once. You might feel that you are missing out on your favorite foods and then be more likely to not follow the plan. Make small changes, and stick with them. Once those changes become habit, add a few more changes. · Try some of the following:  ? Make it a goal to eat a fruit or vegetable at every meal and at snacks. This will make it easy to get the recommended amount of fruits and vegetables each day. ? Try yogurt topped with fruit and nuts for a snack or healthy dessert. ? Add lettuce, tomato, cucumber, and onion to sandwiches. ? Combine a ready-made pizza crust with low-fat mozzarella cheese and lots of vegetable toppings. Try using tomatoes, squash, spinach, broccoli, carrots, cauliflower, and onions. ? Have a variety of cut-up vegetables with a low-fat dip as an appetizer instead of chips and dip. ? Sprinkle sunflower seeds or chopped almonds over salads. Or try adding chopped walnuts or almonds to cooked vegetables. ? Try some vegetarian meals using beans and peas. Add garbanzo or kidney beans to salads. Make burritos and tacos with mashed gaffney beans or black beans. Where can you learn more? Go to https://QuantuModelingpeNews Corp.MarketLive. org and sign in to your [employer] account. Enter L662 in the [facility] box to learn more about \"DASH Diet: Care Instructions. \"     If you do not have an account, please click on the \"Sign Up Now\" link. Current as of: April 29, 2021               Content Version: 13.0  © 0241-9066 Healthwise, [facility].    Care instructions adapted under license by [facility] ([facility]). If you have questions about a medical condition or this instruction, always ask your healthcare professional. [address] any warranty or liability for your use of this information. Patient Education        Sinusitis: Care Instructions  Your Care Instructions     Sinusitis is an infection of the lining of the sinus cavities in your head. Sinusitis often follows a cold. It causes pain and pressure in your head and face. In most cases, sinusitis gets better on its own in 1 to 2 weeks. But some mild symptoms may last for several weeks. Sometimes antibiotics are needed. Follow-up care is a key part of your treatment and safety. Be sure to make and go to all appointments, and call your doctor if you are having problems. It's also a good idea to know your test results and keep a list of the medicines you take. How can you care for yourself at home? · Take an over-the-counter pain medicine, such as acetaminophen (Tylenol), ibuprofen (Advil, Motrin), or naproxen (Aleve). Read and follow all instructions on the label. · If the doctor prescribed antibiotics, take them as directed. Do not stop taking them just because you feel better. You need to take the full course of antibiotics. · Be careful when taking over-the-counter cold or flu medicines and Tylenol at the same time. Many of these medicines have acetaminophen, which is Tylenol. Read the labels to make sure that you are not taking more than the recommended dose. Too much acetaminophen (Tylenol) can be harmful. · Breathe warm, moist air from a steamy shower, a hot bath, or a sink filled with hot water. Avoid cold, dry air. Using a humidifier in your home may help. Follow the directions for cleaning the machine. · Use saline (saltwater) nasal washes. This can help keep your nasal passages open and wash out mucus and bacteria.  You can buy saline nose drops at a grocery store or drugstore. Or you can make your own at home by adding 1 teaspoon of salt and 1 teaspoon of baking soda to 2 cups of distilled water. If you make your own, fill a bulb syringe with the solution, insert the tip into your nostril, and squeeze gently. [employer] your nose. · Put a hot, wet towel or a warm gel pack on your face 3 or 4 times a day for 5 to 10 minutes each time. · Try a decongestant nasal spray like oxymetazoline (Afrin). Do not use it for more than 3 days in a row. Using it for more than 3 days can make your congestion worse. When should you call for help? Call your doctor now or seek immediate medical care if:    · You have new or worse swelling or redness in your face or around your eyes.     · You have a new or higher fever. Watch closely for changes in your health, and be sure to contact your doctor if:    · You have new or worse facial pain.     · The mucus from your nose becomes thicker (like pus) or has new blood in it.     · You are not getting better as expected. Where can you learn more? Go to https://[employer]otech.Personal Factory. org and sign in to your [employer] account. Enter H163 in the [employer] box to learn more about \"Sinusitis: Care Instructions. \"     If you do not have an account, please click on the \"Sign Up Now\" link. Current as of: December 2, 2020               Content Version: 13.0  © 2006-2021 Healthwise, Incorporated. Care instructions adapted under license by [facility] ([facility]). If you have questions about a medical condition or this instruction, always ask your healthcare professional. [place] [address] any warranty or liability for your use of this information.

## 2021-10-04 NOTE — TELEPHONE ENCOUNTER
Keesha Haynes from [facility] called to clarify the order placed today by Dr. Jaya Medina. Keesha Haynes states that they can't complete the order if it needs done with fluoroscopy. I verified with Dr. Jaya Medina, that the xray chest pa lateral with fluoroscopy, should state xray chest pa lateral. Keesha Haynes is informed of this and states that she will place a new order.

## 2021-10-06 ENCOUNTER — TELEPHONE (OUTPATIENT)
Dept: FAMILY MEDICINE CLINIC | Age: 56
End: 2021-10-06

## 2021-10-06 DIAGNOSIS — I10 ESSENTIAL HYPERTENSION: Primary | ICD-10-CM

## 2021-10-06 NOTE — TELEPHONE ENCOUNTER
----- Message from Maura Okeefe sent at 10/6/2021 11:09 AM EDT -----  Subject: Medication Problem    QUESTIONS  Name of Medication? hydroCHLOROthiazide (HYDRODIURIL) 25 MG tablet  Patient-reported dosage and instructions? 25 mg once daily  What question or problem do you have with the medication? pt states she is   having side effects? weakness and confusion, thirsty, not hungry and tired   - allergy to Sulfa  Preferred Pharmacy? [facility] [address] [facility], [address] [facility] phone number (if available)? [phone]  Additional Information for Provider? pt states she is having side effects?   weakness and confusion, thirsty, not hungry and tired - allergy to Sulfa  ---------------------------------------------------------------------------  --------------  CALL BACK INFO  What is the best way for the office to contact you? OK to leave message on   voicemail  Preferred Call Back Phone Number? [phone]  ---------------------------------------------------------------------------  --------------  SCRIPT ANSWERS  Relationship to Patient?  Self

## 2021-10-06 NOTE — TELEPHONE ENCOUNTER
----- Message from Danny Loepz sent at 10/6/2021 11:12 AM EDT -----  Subject: Results Request    QUESTIONS  Which lab or imaging result is the patient calling about? chest x-ray  Which provider ordered the test?   At what location was the test performed? Date the test was performed? 2021-10-04  Additional Information for Provider? pt would like results of chest x-ray   done at [facility] on 10/4/21  ---------------------------------------------------------------------------  --------------  [address]  What is the best way for the office to contact you? OK to leave message on   voicemail  Preferred Call Back Phone Number?  [phone]

## 2021-10-12 RX ORDER — LISINOPRIL 5 MG/1
5 TABLET ORAL DAILY
Qty: 30 TABLET | Refills: 0 | Status: SHIPPED | OUTPATIENT
Start: 2021-10-12 | End: 2021-11-03

## 2021-10-12 NOTE — TELEPHONE ENCOUNTER
The patient's chest xray is negative. No evidence of pneumonia or any other process. She can stop the HCTZ if she is not tolerating it. We can try a different blood pressure agent, which is first line and has low side effect profile. Prescription sent to the pharmacy for lisinopril. Please advise the patient. Instruct the patient to check her BP at home while on the medication. She is to hold the medication and notify us if she gets readings that are less than 100/60. Thank you.

## 2021-11-03 ENCOUNTER — OFFICE VISIT (OUTPATIENT)
Dept: FAMILY MEDICINE CLINIC | Age: 56
End: 2021-11-03
Payer: COMMERCIAL

## 2021-11-03 VITALS
WEIGHT: 190.2 LBS | OXYGEN SATURATION: 99 % | TEMPERATURE: 97.5 F | DIASTOLIC BLOOD PRESSURE: 96 MMHG | HEART RATE: 74 BPM | BODY MASS INDEX: 33.7 KG/M2 | SYSTOLIC BLOOD PRESSURE: 136 MMHG | HEIGHT: 63 IN | RESPIRATION RATE: 16 BRPM

## 2021-11-03 DIAGNOSIS — R05.9 COUGH: ICD-10-CM

## 2021-11-03 DIAGNOSIS — I10 ESSENTIAL HYPERTENSION: Primary | ICD-10-CM

## 2021-11-03 DIAGNOSIS — Z11.4 SCREENING FOR HUMAN IMMUNODEFICIENCY VIRUS WITHOUT PRESENCE OF RISK FACTORS: ICD-10-CM

## 2021-11-03 DIAGNOSIS — Z11.59 ENCOUNTER FOR HEPATITIS C SCREENING TEST FOR LOW RISK PATIENT: ICD-10-CM

## 2021-11-03 LAB — HBA1C MFR BLD: 4.6 % (ref 4.3–5.7)

## 2021-11-03 PROCEDURE — G8427 DOCREV CUR MEDS BY ELIG CLIN: HCPCS | Performed by: STUDENT IN AN ORGANIZED HEALTH CARE EDUCATION/TRAINING PROGRAM

## 2021-11-03 PROCEDURE — G8417 CALC BMI ABV UP PARAM F/U: HCPCS | Performed by: STUDENT IN AN ORGANIZED HEALTH CARE EDUCATION/TRAINING PROGRAM

## 2021-11-03 PROCEDURE — G8484 FLU IMMUNIZE NO ADMIN: HCPCS | Performed by: STUDENT IN AN ORGANIZED HEALTH CARE EDUCATION/TRAINING PROGRAM

## 2021-11-03 PROCEDURE — 83036 HEMOGLOBIN GLYCOSYLATED A1C: CPT | Performed by: STUDENT IN AN ORGANIZED HEALTH CARE EDUCATION/TRAINING PROGRAM

## 2021-11-03 PROCEDURE — 1036F TOBACCO NON-USER: CPT | Performed by: STUDENT IN AN ORGANIZED HEALTH CARE EDUCATION/TRAINING PROGRAM

## 2021-11-03 PROCEDURE — 3017F COLORECTAL CA SCREEN DOC REV: CPT | Performed by: STUDENT IN AN ORGANIZED HEALTH CARE EDUCATION/TRAINING PROGRAM

## 2021-11-03 PROCEDURE — 99213 OFFICE O/P EST LOW 20 MIN: CPT | Performed by: STUDENT IN AN ORGANIZED HEALTH CARE EDUCATION/TRAINING PROGRAM

## 2021-11-03 RX ORDER — LISINOPRIL 10 MG/1
10 TABLET ORAL DAILY
Qty: 30 TABLET | Refills: 0 | Status: SHIPPED | OUTPATIENT
Start: 2021-11-03 | End: 2021-12-08 | Stop reason: SDUPTHER

## 2021-11-03 ASSESSMENT — ENCOUNTER SYMPTOMS
CONSTIPATION: 0
SHORTNESS OF BREATH: 0
VOMITING: 0
COUGH: 1
DIARRHEA: 0
ABDOMINAL PAIN: 0
NAUSEA: 0
WHEEZING: 0

## 2021-11-03 NOTE — PATIENT INSTRUCTIONS
Start lisinopril 10mgs daily. New script sent to pharmacy. Can take 2 of the 5mg tablets daily until you run out  Get labs a week before you next visit. See you in 1 month. Thank you   1. Thank you for trusting us with your healthcare needs. You may receive a survey regarding today's visit. It would help us out if you would take a few moments to provide your feedback. We value your input. 2. Please bring in ALL medications BOTTLES, including inhalers, herbal supplements, over the counter, prescribed & non-prescribed medicine. The office would like actual medication bottles and a list.   3. Please note our OFFICE POLICIES:   a. Prior to getting your labs drawn, please check with your insurance company for benefits and eligibility of lab services. Often, insurance companies cover certain tests for preventative visits only. It is patient's responsibility to see what is covered. b. We are unable to change a diagnosis after the test has been performed. c. Lab orders will not be re-printed. Please hold onto your original lab orders and take them to your lab to be completed. d. If you no show your scheduled appointment three times, you will be dismissed from this practice. e. Rovertoia Becket must be completed 24 hours prior to your schedule appointment. 4. If the list below has been completed, PLEASE FAX RECORDS TO OUR OFFICE @ [phone].  Once the records have been received we will update your records at our office:  Health Maintenance Due   Topic Date Due    Potassium monitoring  Never done    Creatinine monitoring  Never done    Hepatitis C screen  Never done    COVID-19 Vaccine (1) Never done    HIV screen  Never done    Cervical cancer screen  Never done    Lipid screen  Never done    Diabetes screen  Never done    Colon cancer screen colonoscopy  Never done    Breast cancer screen  Never done    Shingles Vaccine (1 of 2) Never done    Flu vaccine (1) Never done             Patient Education High Blood Pressure: Care Instructions  Overview     It's normal for blood pressure to go up and down throughout the day. But if it stays up, you have high blood pressure. Another name for high blood pressure is hypertension. Despite what a lot of people think, high blood pressure usually doesn't cause headaches or make you feel dizzy or lightheaded. It usually has no symptoms. But it does increase your risk of stroke, heart attack, and other problems. You and your doctor will talk about your risks of these problems based on your blood pressure. Your doctor will give you a goal for your blood pressure. Your goal will be based on your health and your age. Lifestyle changes, such as eating healthy and being active, are always important to help lower blood pressure. You might also take medicine to reach your blood pressure goal.  Follow-up care is a key part of your treatment and safety. Be sure to make and go to all appointments, and call your doctor if you are having problems. It's also a good idea to know your test results and keep a list of the medicines you take. How can you care for yourself at home? Medical treatment  · If you stop taking your medicine, your blood pressure will go back up. You may take one or more types of medicine to lower your blood pressure. Be safe with medicines. Take your medicine exactly as prescribed. Call your doctor if you think you are having a problem with your medicine. · Talk to your doctor before you start taking aspirin every day. Aspirin can help certain people lower their risk of a heart attack or stroke. But taking aspirin isn't right for everyone, because it can cause serious bleeding. · See your doctor regularly. You may need to see the doctor more often at first or until your blood pressure comes down. · If you are taking blood pressure medicine, talk to your doctor before you take decongestants or anti-inflammatory medicine, such as ibuprofen.  Some of these medicines can raise blood pressure. · Learn how to check your blood pressure at home. Lifestyle changes  · Stay at a healthy weight. This is especially important if you put on weight around the waist. Losing even 10 pounds can help you lower your blood pressure. · If your doctor recommends it, get more exercise. Walking is a good choice. Bit by bit, increase the amount you walk every day. Try for at least 30 minutes on most days of the week. You also may want to swim, bike, or do other activities. · Avoid or limit alcohol. Talk to your doctor about whether you can drink any alcohol. · Try to limit how much sodium you eat to less than 2,300 milligrams (mg) a day. Your doctor may ask you to try to eat less than 1,500 mg a day. · Eat plenty of fruits (such as bananas and oranges), vegetables, legumes, whole grains, and low-fat dairy products. · Lower the amount of saturated fat in your diet. Saturated fat is found in animal products such as milk, cheese, and meat. Limiting these foods may help you lose weight and also lower your risk for heart disease. · Do not smoke. Smoking increases your risk for heart attack and stroke. If you need help quitting, talk to your doctor about stop-smoking programs and medicines. These can increase your chances of quitting for good. When should you call for help? Call 911  anytime you think you may need emergency care. This may mean having symptoms that suggest that your blood pressure is causing a serious heart or blood vessel problem. Your blood pressure may be over 180/120. For example, call 911 if:    · You have symptoms of a heart attack. These may include:  ? Chest pain or pressure, or a strange feeling in the chest.  ? Sweating. ? Shortness of breath. ? Nausea or vomiting. ? Pain, pressure, or a strange feeling in the back, neck, jaw, or upper belly or in one or both shoulders or arms. ? Lightheadedness or sudden weakness.   ? A fast or irregular heartbeat.     · You have symptoms of a stroke. These may include:  ? Sudden numbness, tingling, weakness, or loss of movement in your face, arm, or leg, especially on only one side of your body. ? Sudden vision changes. ? Sudden trouble speaking. ? Sudden confusion or trouble understanding simple statements. ? Sudden problems with walking or balance. ? A sudden, severe headache that is different from past headaches.     · You have severe back or belly pain. Do not wait until your blood pressure comes down on its own. Get help right away. Call your doctor now or seek immediate care if:    · Your blood pressure is much higher than normal (such as 180/120 or higher), but you don't have symptoms.     · You think high blood pressure is causing symptoms, such as:  ? Severe headache.  ? Blurry vision. Watch closely for changes in your health, and be sure to contact your doctor if:    · Your blood pressure measures higher than your doctor recommends at least 2 times. That means the top number is higher or the bottom number is higher, or both.     · You think you may be having side effects from your blood pressure medicine. Where can you learn more? Go to https://[email or web address] [email or web address] org and sign in to your [employer] account. Enter K892 in the Songwhale box to learn more about \"High Blood Pressure: Care Instructions. \"     If you do not have an account, please click on the \"Sign Up Now\" link. Current as of: April 29, 2021               Content Version: 13.0  © 2006-2021 [employer]. Care instructions adapted under license by [facility] ([facility]). If you have questions about a medical condition or this instruction, always ask your healthcare professional. [place] [address] any warranty or liability for your use of this information.          Patient Education        DASH Diet: Care Instructions  Your Care Instructions     The DASH diet is an eating plan that can help lower your blood pressure. DASH stands for Dietary Approaches to Stop Hypertension. Hypertension is high blood pressure. The DASH diet focuses on eating foods that are high in calcium, potassium, and magnesium. These nutrients can lower blood pressure. The foods that are highest in these nutrients are fruits, vegetables, low-fat dairy products, nuts, seeds, and legumes. But taking calcium, potassium, and magnesium supplements instead of eating foods that are high in those nutrients does not have the same effect. The DASH diet also includes whole grains, fish, and poultry. The DASH diet is one of several lifestyle changes your doctor may recommend to lower your high blood pressure. Your doctor may also want you to decrease the amount of sodium in your diet. Lowering sodium while following the DASH diet can lower blood pressure even further than just the DASH diet alone. Follow-up care is a key part of your treatment and safety. Be sure to make and go to all appointments, and call your doctor if you are having problems. It's also a good idea to know your test results and keep a list of the medicines you take. How can you care for yourself at home? Following the DASH diet  · Eat 4 to 5 servings of fruit each day. A serving is 1 medium-sized piece of fruit, ½ cup chopped or canned fruit, 1/4 cup dried fruit, or 4 ounces (½ cup) of fruit juice. Choose fruit more often than fruit juice. · Eat 4 to 5 servings of vegetables each day. A serving is 1 cup of lettuce or raw leafy vegetables, ½ cup of chopped or cooked vegetables, or 4 ounces (½ cup) of vegetable juice. Choose vegetables more often than vegetable juice. · Get 2 to 3 servings of low-fat and fat-free dairy each day. A serving is 8 ounces of milk, 1 cup of yogurt, or 1 ½ ounces of cheese. · Eat 6 to 8 servings of grains each day.  A serving is 1 slice of bread, 1 ounce of dry cereal, or ½ cup of cooked rice, pasta, or cooked cereal. Try to choose whole-grain products as much as possible. · Limit lean meat, poultry, and fish to 2 servings each day. A serving is 3 ounces, about the size of a deck of cards. · Eat 4 to 5 servings of nuts, seeds, and legumes (cooked dried beans, lentils, and split peas) each week. A serving is 1/3 cup of nuts, 2 tablespoons of seeds, or ½ cup of cooked beans or peas. · Limit fats and oils to 2 to 3 servings each day. A serving is 1 teaspoon of vegetable oil or 2 tablespoons of salad dressing. · Limit sweets and added sugars to 5 servings or less a week. A serving is 1 tablespoon jelly or jam, ½ cup sorbet, or 1 cup of lemonade. · Eat less than 2,300 milligrams (mg) of sodium a day. If you limit your sodium to 1,500 mg a day, you can lower your blood pressure even more. · Be aware that all of these are the suggested number of servings for people who eat 1,800 to 2,000 calories a day. Your recommended number of servings may be different if you need more or fewer calories. Tips for success  · Start small. Do not try to make dramatic changes to your diet all at once. You might feel that you are missing out on your favorite foods and then be more likely to not follow the plan. Make small changes, and stick with them. Once those changes become habit, add a few more changes. · Try some of the following:  ? Make it a goal to eat a fruit or vegetable at every meal and at snacks. This will make it easy to get the recommended amount of fruits and vegetables each day. ? Try yogurt topped with fruit and nuts for a snack or healthy dessert. ? Add lettuce, tomato, cucumber, and onion to sandwiches. ? Combine a ready-made pizza crust with low-fat mozzarella cheese and lots of vegetable toppings. Try using tomatoes, squash, spinach, broccoli, carrots, cauliflower, and onions. ? Have a variety of cut-up vegetables with a low-fat dip as an appetizer instead of chips and dip. ? Sprinkle sunflower seeds or chopped almonds over salads.  Or try adding chopped walnuts or almonds to cooked vegetables. ? Try some vegetarian meals using beans and peas. Add garbanzo or kidney beans to salads. Make burritos and tacos with mashed gaffney beans or black beans. Where can you learn more? Go to https://chpeshelleyeweb.[employer]. org and sign in to your Jigsawt account. Enter O527 in the [employer] box to learn more about \"DASH Diet: Care Instructions. \"     If you do not have an account, please click on the \"Sign Up Now\" link. Current as of: April 29, 2021               Content Version: 13.0  © 9222-2108 [employer]wise[employer]. Care instructions adapted under license by [facility] ([facility]). If you have questions about a medical condition or this instruction, always ask your healthcare professional. [address] any warranty or liability for your use of this information.

## 2021-11-03 NOTE — PROGRESS NOTES
S: 61 y.o. female with   Chief Complaint   Patient presents with    Follow-up     4 Week Follow-up Blood Pressure    Ear Fullness     Left ear fullness with occasional popping started a couple days ago       Here to follow up for HTN sinusitis and cough  hctz was not tolerated so on lisinopril - bp at home is 130s/80-90s  Still trying to lose weight    BP Readings from Last 3 Encounters:   11/03/21 (!) 136/96   10/04/21 (!) 160/100   09/08/21 (!) 140/100     Wt Readings from Last 3 Encounters:   11/03/21 190 lb 3.2 oz (86.3 kg)   10/04/21 193 lb (87.5 kg)   09/08/21 195 lb 9.6 oz (88.7 kg)           O: VS:   Vitals:    11/03/21 1259 11/03/21 1307   BP: (!) 148/100 (!) 136/96   Site: Left Upper Arm Left Upper Arm   Position: Sitting Sitting   Cuff Size: Large Adult Large Adult   Pulse: 74    Resp: 16    Temp: 97.5 °F (36.4 °C)    TempSrc: Temporal    SpO2: 99%    Weight: 190 lb 3.2 oz (86.3 kg)    Height: 5' 3\" (1.6 m)      Body mass index is 33.69 kg/m². AAO/NAD, appropriate affect for mood  Normocephalic, atraumatic, eyes  conjunctiva and sclera normal,   skin no rashes on exposed areas   Insight, judgement normal and in no acute distress      Lab Results   Component Value Date    LABA1C 4.6 11/03/2021       No results found. Diagnosis Orders   1. Essential hypertension  Comprehensive Metabolic Panel    Hepatitis C Antibody    Lipid Panel    HIV Screen    POCT glycosylated hemoglobin (Hb A1C)    CBC With Auto Differential    TSH with Reflex    T4, Free    lisinopril (PRINIVIL;ZESTRIL) 10 MG tablet   2. Cough     3. BMI 33.0-33.9,adult     4. Encounter for hepatitis C screening test for low risk patient  Hepatitis C Antibody   5.  Screening for human immunodeficiency virus without presence of risk factors  HIV Screen       Plan  Will increase to 10mg of the lisinopril    Will keep hydrated for lingering cold symptoms    Discussed weight loss       Return in about 4 weeks (around 12/1/2021) for Annual Wellness Visit + HTN. Orders Placed:  Orders Placed This Encounter   Procedures    Comprehensive Metabolic Panel    Hepatitis C Antibody    Lipid Panel    HIV Screen    CBC With Auto Differential    TSH with Reflex    T4, Free    POCT glycosylated hemoglobin (Hb A1C)     Medications Prescribed:  Orders Placed This Encounter   Medications    lisinopril (PRINIVIL;ZESTRIL) 10 MG tablet     Sig: Take 1 tablet by mouth daily     Dispense:  30 tablet     Refill:  0       Future Appointments   Date Time Provider Flor Zavala   12/8/2021  1:00 PM Reed Del Rio MD [facility] Maintenance Due   Topic Date Due    Potassium monitoring  Never done    Creatinine monitoring  Never done    Hepatitis C screen  Never done    COVID-19 Vaccine (1) Never done    HIV screen  Never done    Cervical cancer screen  Never done    Lipid screen  Never done    Colon cancer screen colonoscopy  Never done    Breast cancer screen  Never done    Shingles Vaccine (1 of 2) Never done    Flu vaccine (1) Never done         Attending Physician Statement  I have discussed the case, including pertinent history and exam findings with the resident. [id number] agree with the documented assessment and plan as documented by the resident.   GE modifier added to this encounter      Diane Rodriguez DO 11/4/2021 11:28 AM

## 2021-11-03 NOTE — PROGRESS NOTES
Health Maintenance Due   Topic Date Due    Potassium monitoring  Never done    Creatinine monitoring  Never done    Hepatitis C screen  Never done    COVID-19 Vaccine (1) Never done    HIV screen  Never done    Cervical cancer screen  Never done    Lipid screen  Never done    Diabetes screen  Never done    Colon cancer screen colonoscopy  Never done    Breast cancer screen  Never done    Shingles Vaccine (1 of 2) Never done    Flu vaccine (1) Never done

## 2021-11-03 NOTE — PROGRESS NOTES
Beck Solis (:  1965) is a 61 y.o. female,Established patient, here for evaluation of the following chief complaint(s):  Follow-up (4 Week Follow-up Blood Pressure) and Ear Fullness (Left ear fullness with occasional popping started a couple days ago)         ASSESSMENT/PLAN:  1. Essential hypertension  -     lisinopril (PRINIVIL;ZESTRIL) 10 MG tablet; Take 1 tablet by mouth daily, Disp-30 tablet, R-0Normal  2. Cough  3. BMI 33.0-33.9,adult  4. Encounter for hepatitis C screening test for low risk patient  5. Screening for human immunodeficiency virus without presence of risk factors    Plan  Increased lisinopril  Continue supportive care at home  Continue with weight loss effort. Encouraged low carb, low salt diet and daily exercise. Get labs    No follow-ups on file. Subjective   SUBJECTIVE/OBJECTIVE:  HPI  Here for follow up    HTN: Did not tolerate HCTZ. Currently on lisinopril 5mg. Tolerating well. Home BP readings show elevated BP in the 130s-140s/80s-90s. Sinusitis and cough: Improved. Had flonase and mucinex. Did receive OMT sinus message during last visit with same day improvement. CXR show no acute findings. Weight loss: Lost 3lbs since last visit. Cut out pop. Eating nuts. Care gaps discussed : [facility] with getting labs. Is not interested in colonoscopy, cologuard, or fit test at this time. Also not interested in mammogram, pelvic or pap. Las pelvic and pap was years ago and negative. Does not follow with OBGYn. Declining covid and flu vaccines at this time. Review of Systems   Constitutional: Negative for fatigue and fever. HENT: Negative for congestion. Eyes: Negative for visual disturbance. Respiratory: Positive for cough. Negative for shortness of breath and wheezing. Cardiovascular: Negative for chest pain and palpitations. Gastrointestinal: Negative for abdominal pain, constipation, diarrhea, nausea and vomiting.    Genitourinary: Negative for frequency, hematuria and urgency. Skin: Negative for rash and wound. Neurological: Negative for weakness and numbness. Psychiatric/Behavioral: Negative for agitation and confusion. Objective   Physical Exam  Vitals and nursing note reviewed. Constitutional:       General: She is not in acute distress. Appearance: Normal appearance. She is not ill-appearing. HENT:      Head: Normocephalic and atraumatic. Right Ear: Tympanic membrane, ear canal and external ear normal.      Left Ear: Tympanic membrane, ear canal and external ear normal.   Eyes:      Extraocular Movements: Extraocular movements intact. Conjunctiva/sclera: Conjunctivae normal.      Pupils: Pupils are equal, round, and reactive to light. Cardiovascular:      Rate and Rhythm: Normal rate and regular rhythm. Pulses: Normal pulses. Heart sounds: No murmur heard. Pulmonary:      Effort: Pulmonary effort is normal. No respiratory distress. Breath sounds: Normal breath sounds. No wheezing. Abdominal:      General: Abdomen is flat. There is no distension. Palpations: Abdomen is soft. Tenderness: There is no abdominal tenderness. Musculoskeletal:         General: Normal range of motion. Cervical back: Normal range of motion and neck supple. No muscular tenderness. Right lower leg: No edema. Left lower leg: No edema. Skin:     General: Skin is warm and dry. Capillary Refill: Capillary refill takes less than 2 seconds. Findings: No rash. Neurological:      General: No focal deficit present. Mental Status: She is alert and oriented to person, place, and time. Sensory: No sensory deficit. Gait: Gait normal.   Psychiatric:         Mood and Affect: Mood normal.         Behavior: Behavior normal.                An electronic signature was used to authenticate this note.     --Tamara Marinelli MD

## 2021-11-10 ENCOUNTER — TELEPHONE (OUTPATIENT)
Dept: FAMILY MEDICINE CLINIC | Age: 56
End: 2021-11-10

## 2021-11-10 DIAGNOSIS — Z12.31 ENCOUNTER FOR SCREENING MAMMOGRAM FOR MALIGNANT NEOPLASM OF BREAST: Primary | ICD-10-CM

## 2021-11-10 NOTE — TELEPHONE ENCOUNTER
Pt called requesting an order for mammogram screening. Please place the order. Pt will have it done at [facility].

## 2021-11-11 ENCOUNTER — HOSPITAL ENCOUNTER (OUTPATIENT)
Dept: WOMENS IMAGING | Age: 56
Discharge: HOME | End: 2021-11-11
Payer: COMMERCIAL

## 2021-11-11 DIAGNOSIS — Z12.31 ENCOUNTER FOR SCREENING MAMMOGRAM FOR MALIGNANT NEOPLASM OF BREAST: ICD-10-CM

## 2021-11-11 PROCEDURE — 77063 BREAST TOMOSYNTHESIS BI: CPT

## 2021-11-29 ENCOUNTER — HOSPITAL ENCOUNTER (OUTPATIENT)
Age: 56
Discharge: HOME | End: 2021-11-29
Payer: COMMERCIAL

## 2021-11-29 DIAGNOSIS — Z11.59 ENCOUNTER FOR HEPATITIS C SCREENING TEST FOR LOW RISK PATIENT: ICD-10-CM

## 2021-11-29 DIAGNOSIS — Z11.4 SCREENING FOR HUMAN IMMUNODEFICIENCY VIRUS WITHOUT PRESENCE OF RISK FACTORS: ICD-10-CM

## 2021-11-29 DIAGNOSIS — I10 ESSENTIAL HYPERTENSION: ICD-10-CM

## 2021-11-29 LAB
ALBUMIN SERPL-MCNC: 4.7 G/DL (ref 3.5–5.1)
ALP BLD-CCNC: 98 U/L (ref 38–126)
ALT SERPL-CCNC: 32 U/L (ref 11–66)
ANION GAP SERPL CALCULATED.3IONS-SCNC: 11 MEQ/L (ref 8–16)
AST SERPL-CCNC: 40 U/L (ref 5–40)
BASOPHILS # BLD: 0.6 %
BASOPHILS ABSOLUTE: 0 THOU/MM3 (ref 0–0.1)
BILIRUB SERPL-MCNC: 0.8 MG/DL (ref 0.3–1.2)
BUN BLDV-MCNC: 12 MG/DL (ref 7–22)
CALCIUM SERPL-MCNC: 9.7 MG/DL (ref 8.5–10.5)
CHLORIDE BLD-SCNC: 105 MEQ/L (ref 98–111)
CHOLESTEROL, TOTAL: 192 MG/DL (ref 100–199)
CO2: 25 MEQ/L (ref 23–33)
CREAT SERPL-MCNC: 0.7 MG/DL (ref 0.4–1.2)
EOSINOPHIL # BLD: 1.9 %
EOSINOPHILS ABSOLUTE: 0.1 THOU/MM3 (ref 0–0.4)
ERYTHROCYTE [DISTWIDTH] IN BLOOD BY AUTOMATED COUNT: 13.3 % (ref 11.5–14.5)
ERYTHROCYTE [DISTWIDTH] IN BLOOD BY AUTOMATED COUNT: 42.2 FL (ref 35–45)
GFR SERPL CREATININE-BSD FRML MDRD: 86 ML/MIN/1.73M2
GLUCOSE BLD-MCNC: 93 MG/DL (ref 70–108)
HCT VFR BLD CALC: 46.4 % (ref 37–47)
HDLC SERPL-MCNC: 61 MG/DL
HEMOGLOBIN: 15.1 GM/DL (ref 12–16)
HEPATITIS C ANTIBODY: NEGATIVE
IMMATURE GRANS (ABS): 0.01 THOU/MM3 (ref 0–0.07)
IMMATURE GRANULOCYTES: 0.2 %
LDL CHOLESTEROL CALCULATED: 115 MG/DL
LYMPHOCYTES # BLD: 17.8 %
LYMPHOCYTES ABSOLUTE: 1 THOU/MM3 (ref 1–4.8)
MCH RBC QN AUTO: 28.4 PG (ref 26–33)
MCHC RBC AUTO-ENTMCNC: 32.5 GM/DL (ref 32.2–35.5)
MCV RBC AUTO: 87.2 FL (ref 81–99)
MONOCYTES # BLD: 7.6 %
MONOCYTES ABSOLUTE: 0.4 THOU/MM3 (ref 0.4–1.3)
NUCLEATED RED BLOOD CELLS: 0 /100 WBC
PLATELET # BLD: 104 THOU/MM3 (ref 130–400)
PMV BLD AUTO: 10.9 FL (ref 9.4–12.4)
POTASSIUM SERPL-SCNC: 4 MEQ/L (ref 3.5–5.2)
RBC # BLD: 5.32 MILL/MM3 (ref 4.2–5.4)
SEG NEUTROPHILS: 71.9 %
SEGMENTED NEUTROPHILS ABSOLUTE COUNT: 3.9 THOU/MM3 (ref 1.8–7.7)
SODIUM BLD-SCNC: 141 MEQ/L (ref 135–145)
T4 FREE: 1.26 NG/DL (ref 0.93–1.76)
TOTAL PROTEIN: 7.2 G/DL (ref 6.1–8)
TRIGL SERPL-MCNC: 81 MG/DL (ref 0–199)
TSH SERPL DL<=0.05 MIU/L-ACNC: 1.48 UIU/ML (ref 0.4–4.2)
WBC # BLD: 5.4 THOU/MM3 (ref 4.8–10.8)

## 2021-11-29 PROCEDURE — 85025 COMPLETE CBC W/AUTO DIFF WBC: CPT

## 2021-11-29 PROCEDURE — 84439 ASSAY OF FREE THYROXINE: CPT

## 2021-11-29 PROCEDURE — 86803 HEPATITIS C AB TEST: CPT

## 2021-11-29 PROCEDURE — 80053 COMPREHEN METABOLIC PANEL: CPT

## 2021-11-29 PROCEDURE — 80061 LIPID PANEL: CPT

## 2021-11-29 PROCEDURE — 87389 HIV-1 AG W/HIV-1&-2 AB AG IA: CPT

## 2021-11-29 PROCEDURE — 36415 COLL VENOUS BLD VENIPUNCTURE: CPT

## 2021-11-29 PROCEDURE — 84443 ASSAY THYROID STIM HORMONE: CPT

## 2021-12-01 LAB — HIV AG/AB: NONREACTIVE

## 2021-12-08 ENCOUNTER — OFFICE VISIT (OUTPATIENT)
Dept: FAMILY MEDICINE CLINIC | Age: 56
End: 2021-12-08
Payer: COMMERCIAL

## 2021-12-08 VITALS
HEIGHT: 63 IN | DIASTOLIC BLOOD PRESSURE: 72 MMHG | BODY MASS INDEX: 33.02 KG/M2 | SYSTOLIC BLOOD PRESSURE: 142 MMHG | WEIGHT: 186.38 LBS | OXYGEN SATURATION: 97 % | TEMPERATURE: 97.2 F | HEART RATE: 91 BPM

## 2021-12-08 DIAGNOSIS — D69.6 THROMBOCYTOPENIA (HCC): ICD-10-CM

## 2021-12-08 DIAGNOSIS — I10 ESSENTIAL HYPERTENSION: Primary | ICD-10-CM

## 2021-12-08 DIAGNOSIS — E66.09 CLASS 1 OBESITY DUE TO EXCESS CALORIES WITH SERIOUS COMORBIDITY AND BODY MASS INDEX (BMI) OF 33.0 TO 33.9 IN ADULT: ICD-10-CM

## 2021-12-08 PROCEDURE — G8427 DOCREV CUR MEDS BY ELIG CLIN: HCPCS | Performed by: STUDENT IN AN ORGANIZED HEALTH CARE EDUCATION/TRAINING PROGRAM

## 2021-12-08 PROCEDURE — G8484 FLU IMMUNIZE NO ADMIN: HCPCS | Performed by: STUDENT IN AN ORGANIZED HEALTH CARE EDUCATION/TRAINING PROGRAM

## 2021-12-08 PROCEDURE — 3017F COLORECTAL CA SCREEN DOC REV: CPT | Performed by: STUDENT IN AN ORGANIZED HEALTH CARE EDUCATION/TRAINING PROGRAM

## 2021-12-08 PROCEDURE — 99214 OFFICE O/P EST MOD 30 MIN: CPT | Performed by: STUDENT IN AN ORGANIZED HEALTH CARE EDUCATION/TRAINING PROGRAM

## 2021-12-08 PROCEDURE — G8417 CALC BMI ABV UP PARAM F/U: HCPCS | Performed by: STUDENT IN AN ORGANIZED HEALTH CARE EDUCATION/TRAINING PROGRAM

## 2021-12-08 PROCEDURE — 1036F TOBACCO NON-USER: CPT | Performed by: STUDENT IN AN ORGANIZED HEALTH CARE EDUCATION/TRAINING PROGRAM

## 2021-12-08 RX ORDER — LISINOPRIL 10 MG/1
10 TABLET ORAL DAILY
Qty: 90 TABLET | Refills: 0 | Status: SHIPPED | OUTPATIENT
Start: 2021-12-08 | End: 2022-03-03 | Stop reason: SDUPTHER

## 2021-12-08 ASSESSMENT — ENCOUNTER SYMPTOMS
SHORTNESS OF BREATH: 0
NAUSEA: 0
PHOTOPHOBIA: 0
ABDOMINAL PAIN: 0
COUGH: 0
CHEST TIGHTNESS: 0
COLOR CHANGE: 0
VOMITING: 0

## 2021-12-08 NOTE — PATIENT INSTRUCTIONS
Patient Education        Learning About High Blood Pressure  What is high blood pressure? Blood pressure is a measure of how hard the blood pushes against the walls of your arteries. It's normal for blood pressure to go up and down throughout the day. But if it stays up, you have high blood pressure. Another name for high blood pressure is hypertension. Two numbers tell you your blood pressure. The first number is the systolic pressure (top number). It shows how hard the blood pushes when your heart is pumping. The second number is the diastolic pressure (bottom number). It shows how hard the blood pushes between heartbeats, when your heart is relaxed and filling with blood. Your doctor will give you a goal for your blood pressure based on your health and your age. High blood pressure (hypertension) means that the top number stays high, or the bottom number stays high, or both. High blood pressure increases the risk of stroke, heart attack, and other problems. What happens when you have high blood pressure? · Blood flows through your arteries with too much force. Over time, this can damage the heart and the walls of your arteries. But you can't feel it. High blood pressure usually doesn't cause symptoms. · High blood pressure makes your heart work harder. And that can lead to heart failure, which means your heart doesn't pump as much blood as your body needs. · Fat and calcium start to build up in your arteries. This buildup is called hardening of the arteries. It can cause many problems including a heart attack and stroke. · Arteries also carry blood and oxygen to organs like your eyes, kidneys, and brain. If high blood pressure damages those arteries, it can lead to vision loss, kidney disease, stroke, and a higher risk of dementia. How can you prevent high blood pressure? · Stay at a healthy weight. · Try to limit how much sodium you eat to less than 2,300 milligrams (mg) a day.  If you limit your sodium to 1,500 mg a day, you can lower your blood pressure even more. ? Buy foods that are labeled \"unsalted,\" \"sodium-free,\" or \"low-sodium. \" Foods labeled \"reduced-sodium\" and \"light sodium\" may still have too much sodium. ? Flavor your food with garlic, lemon juice, onion, vinegar, herbs, and spices instead of salt. Do not use soy sauce, steak sauce, onion salt, garlic salt, mustard, or ketchup on your food. ? Use less salt (or none) when recipes call for it. You can often use half the salt a recipe calls for without losing flavor. · Be physically active. Get at least 30 minutes of exercise on most days of the week. Walking is a good choice. You also may want to do other activities, such as running, swimming, cycling, or playing tennis or team sports. · Limit alcohol to 2 drinks a day for men and 1 drink a day for women. · Eat plenty of fruits, vegetables, and low-fat dairy products. Eat less saturated and total fats. How is high blood pressure treated? · Your doctor will suggest making lifestyle changes to help your heart. For example, your doctor may ask you to eat healthy foods, quit smoking, lose extra weight, and be more active. · If lifestyle changes don't help enough, your doctor may recommend that you take medicine. · When blood pressure is very high, medicines are needed to lower it. Follow-up care is a key part of your treatment and safety. Be sure to make and go to all appointments, and call your doctor if you are having problems. It's also a good idea to know your test results and keep a list of the medicines you take. Where can you learn more? Go to [email or web address] and sign in to your MobileGlobe account. Enter P501 in the Geotender box to learn more about \"Learning About High Blood Pressure. \"     If you do not have an account, please click on the \"Sign Up Now\" link.   Current as of: April 29, 2021               Content Version: 13.0  © 6741-6333 Healthwise, Incorporated. Care instructions adapted under license by [facility] ([facility]). If you have questions about a medical condition or this instruction, always ask your healthcare professional. [address] any warranty or liability for your use of this information.

## 2021-12-08 NOTE — PROGRESS NOTES
[address] [facility] [address] [facility] [place] [address]  Dept: [phone]  Dept Fax: [phone]  Loc: Davide Wakefield is a 61 y.o. female who presents today for:  Chief Complaint   Patient presents with    Check-Up    Hypertension    Medication Refill       HPI:   Patient here for 1 month follow-up visit. Patient follows with Dr. Maria Camargo. HTN: Lisinopril increased from 5mg to 10mg. BP logs look great. Her numbers range from 120s/80s-low 130s/80s. No low BP. Denies any chest pain, dyspnea, headache, blurry vision. Denies any dry cough. Here to review labs, as well. Has been working to lose weight by controlling her diet. Doesn't get much exercise in but stays active taking care of her older mother. History reviewed. No pertinent past medical history.    Past Surgical History:   Procedure Laterality Date    APPENDECTOMY      BONE GRAFT      LEFT  KNEE     SECTION      [facility]    FEMUR FRACTURE SURGERY      MOUTH SURGERY      OTHER SURGICAL HISTORY  2012    HARDWARE REMOVAL LEFT HIP    TONSILLECTOMY AND ADENOIDECTOMY  1970    [facility]     Family History   Problem Relation Age of Onset    Stroke Mother     High Blood Pressure Mother     Thyroid Disease Mother     Heart Disease Father         CHF and also smoker    Heart Attack Father     High Blood Pressure Sister     High Blood Pressure Sister      Social History     Tobacco Use    Smoking status: Never Smoker    Smokeless tobacco: Never Used   Substance Use Topics    Alcohol use: Yes     Comment: rarely      Current Outpatient Medications   Medication Sig Dispense Refill    lisinopril (PRINIVIL;ZESTRIL) 10 MG tablet Take 1 tablet by mouth daily 90 tablet 0    Ascorbic Acid (VITAMIN C) 250 MG tablet Take 125 mg by mouth daily      Blood Pressure Monitor KIT Check blood pressures twice a day. Sit and relax for 30mins prior to checking. Make sure arm is at heart level when checking. 1 kit 0    Calcium Carbonate-Vitamin D (CALTRATE 600+D) 600-400 MG-UNIT CHEW Take 1 tablet by mouth daily. No current facility-administered medications for this visit. Allergies   Allergen Reactions    Latex Shortness Of Breath    Penicillins Anaphylaxis    Other Itching     Bleach in bedding, itching  Unable to swallow large pills per patient    Sulfa Antibiotics      \" unsure\"    Tetanus Toxoids        Health Maintenance   Topic Date Due    COVID-19 Vaccine (1) Never done    Cervical cancer screen  Never done    Colon cancer screen colonoscopy  Never done    Shingles Vaccine (1 of 2) Never done    Flu vaccine (1) Never done    Potassium monitoring  11/29/2022    Creatinine monitoring  11/29/2022    Breast cancer screen  11/11/2023    Lipid screen  11/29/2026    Hepatitis C screen  Completed    HIV screen  Completed    Hepatitis A vaccine  Aged Out    Hepatitis B vaccine  Aged Out    Hib vaccine  Aged Out    Meningococcal (ACWY) vaccine  Aged Out    Pneumococcal 0-64 years Vaccine  Aged Out       Subjective:      Review of Systems   Constitutional: Negative for chills and fever. Eyes: Negative for photophobia and visual disturbance. Respiratory: Negative for cough, chest tightness and shortness of breath. Cardiovascular: Negative for chest pain and palpitations. Gastrointestinal: Negative for abdominal pain, nausea and vomiting. Skin: Negative for color change and rash. Neurological: Negative for dizziness, light-headedness and headaches. Psychiatric/Behavioral: Negative for decreased concentration. The patient is not nervous/anxious. Objective:     Vitals:    12/08/21 1331   BP: (!) 142/72   Pulse: 91   Temp: 97.2 °F (36.2 °C)   TempSrc: Skin   SpO2: 97%   Weight: 186 lb 6 oz (84.5 kg)   Height: 5' 3\" (1.6 m)       Body mass index is 33.01 kg/m².     Wt Readings from Last 3 Encounters:   12/08/21 186 lb 6 oz (84.5 kg)   11/03/21 190 lb 3.2 oz (86.3 kg)   10/04/21 193 lb (87.5 kg)     BP Readings from Last 3 Encounters:   12/08/21 (!) 142/72   11/03/21 (!) 136/96   10/04/21 (!) 160/100       Physical Exam  Vitals reviewed. Constitutional:       Appearance: Normal appearance. She is obese. HENT:      Head: Normocephalic and atraumatic. Right Ear: External ear normal.      Left Ear: External ear normal.   Eyes:      Conjunctiva/sclera: Conjunctivae normal.   Cardiovascular:      Rate and Rhythm: Normal rate and regular rhythm. Pulses: Normal pulses. Heart sounds: Normal heart sounds. Pulmonary:      Effort: Pulmonary effort is normal.      Breath sounds: Normal breath sounds. Skin:     General: Skin is warm. Neurological:      Mental Status: She is oriented to person, place, and time. Psychiatric:         Mood and Affect: Mood normal.         Behavior: Behavior normal.         Lab Results   Component Value Date    WBC 5.4 11/29/2021    HGB 15.1 11/29/2021    HCT 46.4 11/29/2021     (L) 11/29/2021    CHOL 192 11/29/2021    TRIG 81 11/29/2021    HDL 61 11/29/2021    LDLCALC 115 11/29/2021    AST 40 11/29/2021     11/29/2021    K 4.0 11/29/2021     11/29/2021    CREATININE 0.7 11/29/2021    BUN 12 11/29/2021    CO2 25 11/29/2021    TSH 1.480 11/29/2021    LABA1C 4.6 11/03/2021    LABGLOM 86 (A) 11/29/2021    CALCIUM 9.7 11/29/2021       Imaging Results:    [facility] MIGUEL ANGEL DIGITAL SCREEN BILATERAL    Result Date: 11/12/2021  LOCATION: [place] PROCEDURE: [facility] MIGUEL ANGEL DIGITAL SCREEN BILATERAL CLINICAL INFORMATION: Encounter for screening mammogram for malignant neoplasm of breast. Tomosynthesis. CLINICAL:     Screening and Baseline PATIENT MEDICAL HISTORY:  No relevant medical history has been documented for this patient. FAMILY HISTORY:   No relevant family history has been documented for this patient.  RISK VALUES: Tyrer-Cuzick 10yr.: 2.6%, [facility]:   7.7% COMPARISON: There are no prior exams for comparison. TECHNIQUE: Bilateral CC and MLO views of the breasts were obtained. 3D tomosynthesis was utilized. CAD was utilized. BREAST COMPOSITION: The tissue of the breast(s) is predominantly fatty. FINDINGS:  There are bilateral scattered benign calcifications. No significant masses, calcifications, or other findings are seen in the breast(s). No mammographic evidence of malignancy. A 1 year screening mammogram is recommended. A result letter will be sent to the patient. She will also receive a reminder 1 month prior to her next mammogram. . BI-RADS CATEGORY 2: BENIGN FINDINGS. Management Recommendation: Routine annual mammography. **This report has been created using voice recognition software. It may contain minor errors which are inherent in voice recognition technology. ** Final report electronically signed by Dr. Elizabeth Suarez on 11/12/2021 5:17 AM        Assessment / Plan:     Toby Childers was seen today for check-up, hypertension and medication refill. Diagnoses and all orders for this visit:    Essential hypertension:  - Home BP logs reviewed, BP controlled. Elevated in office today due to nerves. No side effects with Lisinopril.   - Continue on Lisinopril 10mg daily  - Continue to monitor BP at home a few times a week  - Labs reviewed with patient  -     lisinopril (PRINIVIL;ZESTRIL) 10 MG tablet; Take 1 tablet by mouth daily    Class 1 obesity due to excess calories with serious comorbidity and body mass index (BMI) of 33.0 to 33.9 in adult:  - Continue with lifestyle interventions.    - Labs reviewed  The 10-year ASCVD risk score (Marnie Lopez., et al., 2013) is: 3.5%    Values used to calculate the score:      Age: 61 years      Sex: Female      Is Non- : No      Diabetic: No      Tobacco smoker: No      Systolic Blood Pressure: 377 mmHg      Is BP treated: Yes      HDL Cholesterol: 61 mg/dL      Total Cholesterol: 192 mg/dL      Thrombocytopenia ([facility] [address]):  - Isolated. No bleeding or bruising reported. Can consider repeating in a few months. Patient agreeable to wait. Return in about 3 months (around 3/8/2022) for HTN. Medications Prescribed:  Orders Placed This Encounter   Medications    lisinopril (PRINIVIL;ZESTRIL) 10 MG tablet     Sig: Take 1 tablet by mouth daily     Dispense:  90 tablet     Refill:  0       Future Appointments   Date Time Provider lFor [place]   3/9/2022  1:00 PM Lela Hopson MD [facility] [address] [facility]       Patient given educational materials - see patient instructions. Discussed use, benefit, and sideeffects of prescribed medications. All patient questions answered. Pt voiced understanding. Reviewed health maintenance. Instructed to continue current medications, diet and exercise. Patient agreed with treatment plan. Follow up as directed.      Electronically signed by Wilmer Gonzales MD on 12/8/2021 at 2:31 PM

## 2021-12-08 NOTE — PROGRESS NOTES
S: 61 y.o. female with   Chief Complaint   Patient presents with    Check-Up    Hypertension    Medication Refill       HPI: please see resident note for HPI and ROS. BP Readings from Last 3 Encounters:   12/08/21 (!) 142/72   11/03/21 (!) 136/96   10/04/21 (!) 160/100     Wt Readings from Last 3 Encounters:   12/08/21 186 lb 6 oz (84.5 kg)   11/03/21 190 lb 3.2 oz (86.3 kg)   10/04/21 193 lb (87.5 kg)       O: VS:  height is 5' 3\" (1.6 m) and weight is 186 lb 6 oz (84.5 kg). Her skin temperature is 97.2 °F (36.2 °C). Her blood pressure is 142/72 (abnormal) and her pulse is 91. Her oxygen saturation is 97%. AAO/NAD, appropriate affect for mood     Diagnosis Orders   1. Essential hypertension  lisinopril (PRINIVIL;ZESTRIL) 10 MG tablet   2. Thrombocytopenia ([address]         Plan:  bp controlled at home. Continue current med. Labs reviewed. Repeat cbc for thrombocytopenia. Health Maintenance Due   Topic Date Due    COVID-19 Vaccine (1) Never done    Cervical cancer screen  Never done    Colon cancer screen colonoscopy  Never done    Shingles Vaccine (1 of 2) Never done    Flu vaccine (1) Never done       Attending Physician Statement  I have discussed the case, including pertinent history and exam findings with the resident. I also have seen the patient and performed key portions of the examination. I agree with the documented assessment and plan as documented by the resident.         Iraj Campos DO 12/8/2021 1:52 PM

## 2022-01-19 ENCOUNTER — HOSPITAL ENCOUNTER (EMERGENCY)
Age: 57
Discharge: HOME | End: 2022-01-19
Payer: COMMERCIAL

## 2022-01-19 VITALS
TEMPERATURE: 97.9 F | HEIGHT: 63 IN | RESPIRATION RATE: 16 BRPM | BODY MASS INDEX: 32.96 KG/M2 | HEART RATE: 90 BPM | WEIGHT: 186 LBS | OXYGEN SATURATION: 98 %

## 2022-01-19 DIAGNOSIS — R43.9 SENSE OF SMELL ALTERED: Primary | ICD-10-CM

## 2022-01-19 DIAGNOSIS — Z20.822 EXPOSURE TO COVID-19 VIRUS: ICD-10-CM

## 2022-01-19 DIAGNOSIS — Z20.822 SUSPECTED 2019 NOVEL CORONAVIRUS INFECTION: ICD-10-CM

## 2022-01-19 LAB
INFLUENZA A: NOT DETECTED
INFLUENZA B: NOT DETECTED
SARS-COV-2 RNA, RT PCR: DETECTED

## 2022-01-19 PROCEDURE — 99213 OFFICE O/P EST LOW 20 MIN: CPT

## 2022-01-19 PROCEDURE — 99213 OFFICE O/P EST LOW 20 MIN: CPT | Performed by: NURSE PRACTITIONER

## 2022-01-19 PROCEDURE — 87636 SARSCOV2 & INF A&B AMP PRB: CPT

## 2022-01-19 ASSESSMENT — ENCOUNTER SYMPTOMS
TROUBLE SWALLOWING: 0
VOMITING: 0
COLOR CHANGE: 0
COUGH: 1
SORE THROAT: 0
SHORTNESS OF BREATH: 0
BACK PAIN: 0
SINUS PRESSURE: 0
RHINORRHEA: 0
NAUSEA: 0
DIARRHEA: 0

## 2022-01-19 NOTE — ED PROVIDER NOTES
[facility]  Urgent Care Encounter       CHIEF COMPLAINT       Chief Complaint   Patient presents with    Concern For COVID-19     sinus cough loss of smell x 9 days       Nurses Notes reviewed and I agree except as noted in the HPI. HISTORY OF PRESENT ILLNESS   Gerri Garcia is a 61 y.o. female who presents to the urgent care center complaining of sinus congestion and a nonproductive cough. Patient states that she needs tested for COVID because she had positive exposure she states that she lost her sense of smell 9 days ago. Patient at present time does not appear to be in any acute distress. Skin is pink warm and dry. The history is provided by the patient. No  was used. Cough  Cough characteristics:  Productive  Sputum characteristics:  Green  Severity:  Mild  Onset quality:  Gradual  Duration:  10 days  Timing:  Intermittent  Progression:  Waxing and waning  Chronicity:  New  Smoker: no    Context: sick contacts    Relieved by:  None tried  Worsened by:  Nothing  Ineffective treatments:  None tried  Associated symptoms: no chest pain, no chills, no ear pain, no fever, no headaches, no rash, no rhinorrhea, no shortness of breath and no sore throat        REVIEW OF SYSTEMS     Review of Systems   Constitutional: Negative for activity change, appetite change, chills, fatigue and fever. HENT: Positive for congestion. Negative for ear pain, rhinorrhea, sinus pressure, sore throat and trouble swallowing. Respiratory: Positive for cough. Negative for shortness of breath. Cardiovascular: Negative for chest pain. Gastrointestinal: Negative for diarrhea, nausea and vomiting. Genitourinary: Negative for difficulty urinating and dysuria. Musculoskeletal: Negative for back pain and neck stiffness. Skin: Negative for color change and rash. Allergic/Immunologic: Negative for environmental allergies.    Neurological: Negative for dizziness, light-headedness and headaches. Hematological: Negative for adenopathy. PAST MEDICAL HISTORY         Diagnosis Date    Hypertension        SURGICALHISTORY     Patient  has a past surgical history that includes Tonsillectomy and adenoidectomy ();  section (); Cholecystectomy (); Mouth surgery; bone graft (); Femur fracture surgery (); Appendectomy (); and other surgical history (2012). CURRENT MEDICATIONS       Discharge Medication List as of 2022 12:26 PM      CONTINUE these medications which have NOT CHANGED    Details   lisinopril (PRINIVIL;ZESTRIL) 10 MG tablet Take 1 tablet by mouth daily, Disp-90 tablet, R-0Normal      Ascorbic Acid (VITAMIN C) 250 MG tablet Take 125 mg by mouth dailyHistorical Med      Calcium Carbonate-Vitamin D (CALTRATE 600+D) 600-400 MG-UNIT CHEW Take 1 tablet by mouth daily. Blood Pressure Monitor KIT Disp-1 kit, R-0, NormalCheck blood pressures twice a day. Sit and relax for 30mins prior to checking. Make sure arm is at heart level when checking. ALLERGIES     Patient is is allergic to latex, penicillins, other, sulfa antibiotics, and tetanus toxoids. Patients   There is no immunization history on file for this patient. FAMILY HISTORY     Patient's family history includes Heart Attack in her father; Heart Disease in her father; High Blood Pressure in her mother, sister, and sister; Stroke in her mother; Thyroid Disease in her mother. SOCIAL HISTORY     Patient  reports that she has never smoked. She has never used smokeless tobacco. She reports previous alcohol use. She reports that she does not use drugs. PHYSICAL EXAM     ED TRIAGE VITALS  BP:  (pt states \"get cuff off its too tight\"), Temp: 97.9 °F (36.6 °C), Pulse: 90, Resp: 16, SpO2: 98 %,Estimated body mass index is 32.95 kg/m² as calculated from the following:    Height as of this encounter: 5' 3\" (1.6 m).     Weight as of this encounter: 186 lb (84.4 kg). ,No LMP recorded. Patient is postmenopausal.    Physical Exam  Vitals and nursing note reviewed. Constitutional:       General: She is not in acute distress. Appearance: Normal appearance. She is well-developed and well-groomed. She is not ill-appearing, toxic-appearing or diaphoretic. HENT:      Head: Normocephalic. Right Ear: Hearing, tympanic membrane, ear canal and external ear normal. No drainage, swelling or tenderness. No mastoid tenderness. Tympanic membrane is not erythematous. Left Ear: Hearing, tympanic membrane, ear canal and external ear normal. No drainage, swelling or tenderness. No mastoid tenderness. Tympanic membrane is not erythematous. Nose: Congestion and rhinorrhea present. Right Sinus: No maxillary sinus tenderness or frontal sinus tenderness. Left Sinus: No maxillary sinus tenderness or frontal sinus tenderness. Mouth/Throat:      Lips: Pink. Mouth: Mucous membranes are moist.      Pharynx: Uvula midline. No posterior oropharyngeal erythema or uvula swelling. Tonsils: No tonsillar exudate or tonsillar abscesses. Eyes:      Conjunctiva/sclera: Conjunctivae normal.      Pupils: Pupils are equal, round, and reactive to light. Cardiovascular:      Rate and Rhythm: Normal rate and regular rhythm. Heart sounds: Normal heart sounds. Pulmonary:      Effort: Pulmonary effort is normal. No accessory muscle usage. Breath sounds: Normal breath sounds. No decreased breath sounds, wheezing, rhonchi or rales. Chest:   Breasts:      Right: No supraclavicular adenopathy. Left: No supraclavicular adenopathy. Abdominal:      General: Bowel sounds are normal.      Palpations: Abdomen is soft. Tenderness: There is no abdominal tenderness. There is no right CVA tenderness, left CVA tenderness or guarding. Negative signs include Garzon's sign.    Musculoskeletal:      Cervical back: Full passive range of motion without pain and normal range of motion. No rigidity. Lymphadenopathy:      Head:      Right side of head: No submental, submandibular, tonsillar, preauricular, posterior auricular or occipital adenopathy. Left side of head: No submental, submandibular, tonsillar, preauricular, posterior auricular or occipital adenopathy. Cervical: No cervical adenopathy. Right cervical: No superficial, deep or posterior cervical adenopathy. Left cervical: No superficial, deep or posterior cervical adenopathy. Upper Body:      Right upper body: No supraclavicular adenopathy. Left upper body: No supraclavicular adenopathy. Skin:     General: Skin is warm and dry. Capillary Refill: Capillary refill takes less than 2 seconds. Findings: No rash. Neurological:      Mental Status: She is alert and oriented to person, place, and time. Psychiatric:         Mood and Affect: Mood normal.         Behavior: Behavior normal. Behavior is cooperative. DIAGNOSTIC RESULTS     Labs:No results found for this visit on 01/19/22. IMAGING:    No orders to display         EKG:      URGENT CARE COURSE:     Vitals:    01/19/22 1204   Pulse: 90   Resp: 16   Temp: 97.9 °F (36.6 °C)   TempSrc: Temporal   SpO2: 98%   Weight: 186 lb (84.4 kg)   Height: 5' 3\" (1.6 m)       Medications - No data to display         PROCEDURES:  None    FINAL IMPRESSION      1. Sense of smell altered    2. Exposure to COVID-19 virus    3. Suspected 2019 novel coronavirus infection          DISPOSITION/ PLAN      The patient/Patient representative was advised to rest, drink lots of fluids, take Motrin and Tylenol for any fever, chills generalized body aches. The patient was also advised to monitor urine output for signs of dehydration.   If the patient develops any chest pain, shortness of breath, neck pain or stiffness or abdominal pain or any other concerns, the patient is to call 911 or go to the emergency department for further evaluation. If the patient does not experience any of the above symptoms he is to follow-up with his primary care provider in 2-3 days for reevaluation. The patient/Patient representative are agreeable to the treatment plan at this time. The patient left the urgent care center in stable condition.       PATIENT REFERRED TO:  Kalyn Mathew MD  [facility] / [facility] [address]      DISCHARGE MEDICATIONS:  Discharge Medication List as of 1/19/2022 12:26 PM          Discharge Medication List as of 1/19/2022 12:26 PM          Discharge Medication List as of 1/19/2022 12:26 PM          RUDDY White CNP    (Please note that portions of this note were completed with a voice recognition program. Efforts were made to edit the dictations but occasionally words are mis-transcribed.)           RUDDY White CNP  01/19/22 1445

## 2022-01-19 NOTE — ED TRIAGE NOTES
Pt ambulatory into esuc with c/o sinus drainage, cough and loss of smell for the past nine days. Pt states she has had exposure.  Pt denies pain at this time,

## 2022-01-19 NOTE — LETTER
[facility]  [address]  [address]  Phone: [phone]               January 19, 2022    Patient: Cielo Mccoy   YOB: 1965   Date of Visit: 1/19/2022       To Whom It May Concern:    Ta Nielsen was seen and treated in our emergency department on 1/19/2022. The patient was tested for COVID-19 today and the results may take up to 24 hours. If the patient test positive, they should be off work 5 days from the start of symptoms and fever free for 24 hours. If the patient needs FMLA papers filled out they must go through their primary care provider. If the results are negative, they can return to work with no restrictions. If there are questions or concerns, please have the patient contact our office.       Sincerely,       RUDDY Land CNP         Signature:___Electronically signed by RUDDY Land CNP on 1/19/2022 at 12:13 PM  _______________________________

## 2022-01-20 ENCOUNTER — TELEPHONE (OUTPATIENT)
Dept: FAMILY MEDICINE CLINIC | Age: 57
End: 2022-01-20

## 2022-01-20 NOTE — TELEPHONE ENCOUNTER
The patient recently tested positive for COVID 1 days ago. Please call the patient to see how she is doing. Arrange for virtual visit follow up if patient is able and willing. Thank you.

## 2022-02-02 ENCOUNTER — TELEPHONE (OUTPATIENT)
Dept: FAMILY MEDICINE CLINIC | Age: 57
End: 2022-02-02

## 2022-02-02 NOTE — TELEPHONE ENCOUNTER
----- Message from Fareed Ruthie sent at 2/2/2022  1:21 PM EST -----  Subject: Message to Provider    QUESTIONS  Information for Provider? PT tested positive for COVID around 1/11-18 of January (cannot remember exact date. She is wanting to know how long to   wait before getting COVID vaccine #1. PT would like [employer]. PT   requesting call back. ---------------------------------------------------------------------------  --------------  Soy VELASCO  What is the best way for the office to contact you? OK to leave message on   voicemail  Preferred Call Back Phone Number? [phone]  ---------------------------------------------------------------------------  --------------  SCRIPT ANSWERS  Relationship to Patient?  Self

## 2022-02-03 NOTE — TELEPHONE ENCOUNTER
According to the CDC, if the patient can get vaccinated against COVID 19 anytime after fully recovering as long as she wasn't treated treated with monoclonal antibodies or convalescent plasma. I will also forward this to our pharmacist, Dr. Reyes Hipps for confirmation. Thank you.

## 2022-03-03 DIAGNOSIS — I10 ESSENTIAL HYPERTENSION: ICD-10-CM

## 2022-03-03 RX ORDER — LISINOPRIL 10 MG/1
10 TABLET ORAL DAILY
Qty: 90 TABLET | Refills: 0 | Status: SHIPPED | OUTPATIENT
Start: 2022-03-03 | End: 2022-03-14 | Stop reason: SINTOL

## 2022-03-14 ENCOUNTER — OFFICE VISIT (OUTPATIENT)
Dept: FAMILY MEDICINE CLINIC | Age: 57
End: 2022-03-14
Payer: COMMERCIAL

## 2022-03-14 ENCOUNTER — NURSE ONLY (OUTPATIENT)
Dept: LAB | Age: 57
End: 2022-03-14

## 2022-03-14 VITALS
OXYGEN SATURATION: 99 % | BODY MASS INDEX: 32.96 KG/M2 | RESPIRATION RATE: 20 BRPM | HEIGHT: 63 IN | DIASTOLIC BLOOD PRESSURE: 80 MMHG | WEIGHT: 186 LBS | HEART RATE: 77 BPM | SYSTOLIC BLOOD PRESSURE: 128 MMHG | TEMPERATURE: 96.8 F

## 2022-03-14 DIAGNOSIS — I10 ESSENTIAL HYPERTENSION: ICD-10-CM

## 2022-03-14 DIAGNOSIS — I10 ESSENTIAL HYPERTENSION: Primary | ICD-10-CM

## 2022-03-14 LAB
ANION GAP SERPL CALCULATED.3IONS-SCNC: 9 MEQ/L (ref 8–16)
BUN BLDV-MCNC: 17 MG/DL (ref 7–22)
CALCIUM SERPL-MCNC: 9.9 MG/DL (ref 8.5–10.5)
CHLORIDE BLD-SCNC: 102 MEQ/L (ref 98–111)
CO2: 28 MEQ/L (ref 23–33)
CREAT SERPL-MCNC: 0.7 MG/DL (ref 0.4–1.2)
GFR SERPL CREATININE-BSD FRML MDRD: 86 ML/MIN/1.73M2
GLUCOSE BLD-MCNC: 84 MG/DL (ref 70–108)
POTASSIUM SERPL-SCNC: 4.4 MEQ/L (ref 3.5–5.2)
SODIUM BLD-SCNC: 139 MEQ/L (ref 135–145)

## 2022-03-14 PROCEDURE — G8417 CALC BMI ABV UP PARAM F/U: HCPCS | Performed by: STUDENT IN AN ORGANIZED HEALTH CARE EDUCATION/TRAINING PROGRAM

## 2022-03-14 PROCEDURE — G8427 DOCREV CUR MEDS BY ELIG CLIN: HCPCS | Performed by: STUDENT IN AN ORGANIZED HEALTH CARE EDUCATION/TRAINING PROGRAM

## 2022-03-14 PROCEDURE — G8484 FLU IMMUNIZE NO ADMIN: HCPCS | Performed by: STUDENT IN AN ORGANIZED HEALTH CARE EDUCATION/TRAINING PROGRAM

## 2022-03-14 PROCEDURE — 3017F COLORECTAL CA SCREEN DOC REV: CPT | Performed by: STUDENT IN AN ORGANIZED HEALTH CARE EDUCATION/TRAINING PROGRAM

## 2022-03-14 PROCEDURE — 1036F TOBACCO NON-USER: CPT | Performed by: STUDENT IN AN ORGANIZED HEALTH CARE EDUCATION/TRAINING PROGRAM

## 2022-03-14 PROCEDURE — 99214 OFFICE O/P EST MOD 30 MIN: CPT | Performed by: STUDENT IN AN ORGANIZED HEALTH CARE EDUCATION/TRAINING PROGRAM

## 2022-03-14 RX ORDER — LOSARTAN POTASSIUM 25 MG/1
25 TABLET ORAL DAILY
Qty: 30 TABLET | Refills: 0 | Status: SHIPPED | OUTPATIENT
Start: 2022-03-14 | End: 2022-06-21

## 2022-03-14 ASSESSMENT — ENCOUNTER SYMPTOMS
SHORTNESS OF BREATH: 0
NAUSEA: 0
DIARRHEA: 0
COUGH: 1
VOMITING: 0
CONSTIPATION: 0
ABDOMINAL PAIN: 0
WHEEZING: 0

## 2022-03-14 NOTE — PROGRESS NOTES
After pharmacist chart review, the following recommendations are made:    1. If patient tolerating lisinopril 10 mg daily and BP is greater than recommended goal of <130/80 - recommend increasing lisinopril to 40 mg daily. 2. Last set of labs appear to be in 11/20221 - recommend obtaining potassium an Scr (BMP) due to adverse effects of lisinopril. 3. Recommended health maintenance for this patient include: colonoscopy/cervical cancer screenings & shingles/flu/COVID vaccine.      For Pharmacy Admin Tracking Only     CPA in place:  No   Recommendation Provided To: Provider: 3 via Note to Provider   Intervention Detail: Dose Adjustment: 1, reason: Therapy Optimization, Lab(s) Ordered and Vaccine Recommended/Administered   Gap Closed?: No    Intervention Accepted By: Provider: 3   Time Spent (min): 20      Drema Simmonds, PharmD  8:20 AM  3/14/2022

## 2022-03-14 NOTE — PROGRESS NOTES
S: 61 y.o. female with   Chief Complaint   Patient presents with    Check-Up       HPI: please see resident note for HPI and ROS. BP Readings from Last 3 Encounters:   03/14/22 128/80   12/08/21 (!) 142/72   11/03/21 (!) 136/96     Wt Readings from Last 3 Encounters:   03/14/22 186 lb (84.4 kg)   01/19/22 186 lb (84.4 kg)   12/08/21 186 lb 6 oz (84.5 kg)       O: VS:  height is 5' 3\" (1.6 m) and weight is 186 lb (84.4 kg). Her skin temperature is 96.8 °F (36 °C). Her blood pressure is 128/80 and her pulse is 77. Her respiration is 20 and oxygen saturation is 99%. AAO/NAD, appropriate affect for mood       Diagnosis Orders   1. Essential hypertension  losartan (COZAAR) 25 MG tablet    Basic Metabolic Panel   2. BMI 33.0-33.9,adult         Plan:  Stop lisinopril. Start losartan 25mg daily. Lab as ordered. Diet and exercise encouraged to lose wt. Declines colonoscopy, mammo. Declines immunizations. Health Maintenance Due   Topic Date Due    Cervical cancer screen  Never done    Colorectal Cancer Screen  Never done    Shingles Vaccine (1 of 2) Never done    Flu vaccine (1) Never done       Attending Physician Statement  I have discussed the case, including pertinent history and exam findings with the resident. I also have seen the patient and performed key portions of the examination. I agree with the documented assessment and plan as documented by the resident.         Olga Watts DO 3/14/2022 1:36 PM

## 2022-03-14 NOTE — PATIENT INSTRUCTIONS
Stop lisinopril. Start losartan 25mg. Continue to work on weight loss  Consider colonoscopy in the future  Consider shingles vaccine in the future  Follow in 1 month for annual and BP check. Patient Education        High Blood Pressure: Care Instructions  Overview     It's normal for blood pressure to go up and down throughout the day. But if it stays up, you have high blood pressure. Another name for high blood pressure is hypertension. Despite what a lot of people think, high blood pressure usually doesn't cause headaches or make you feel dizzy or lightheaded. It usually has no symptoms. But it does increase your risk of stroke, heart attack, and other problems. You and your doctor will talk about your risks of these problems based on your blood pressure. Your doctor will give you a goal for your blood pressure. Your goal will be based on your health and your age. Lifestyle changes, such as eating healthy and being active, are always important to help lower blood pressure. You might also take medicine to reach your blood pressure goal.  Follow-up care is a key part of your treatment and safety. Be sure to make and go to all appointments, and call your doctor if you are having problems. It's also a good idea to know your test results and keep a list of the medicines you take. How can you care for yourself at home? Medical treatment  · If you stop taking your medicine, your blood pressure will go back up. You may take one or more types of medicine to lower your blood pressure. Be safe with medicines. Take your medicine exactly as prescribed. Call your doctor if you think you are having a problem with your medicine. · Talk to your doctor before you start taking aspirin every day. Aspirin can help certain people lower their risk of a heart attack or stroke. But taking aspirin isn't right for everyone, because it can cause serious bleeding. · See your doctor regularly.  You may need to see the doctor more often at first or until your blood pressure comes down. · If you are taking blood pressure medicine, talk to your doctor before you take decongestants or anti-inflammatory medicine, such as ibuprofen. Some of these medicines can raise blood pressure. · Learn how to check your blood pressure at home. Lifestyle changes  · Stay at a healthy weight. This is especially important if you put on weight around the waist. Losing even 10 pounds can help you lower your blood pressure. · If your doctor recommends it, get more exercise. Walking is a good choice. Bit by bit, increase the amount you walk every day. Try for at least 30 minutes on most days of the week. You also may want to swim, bike, or do other activities. · Avoid or limit alcohol. Talk to your doctor about whether you can drink any alcohol. · Try to limit how much sodium you eat to less than 2,300 milligrams (mg) a day. Your doctor may ask you to try to eat less than 1,500 mg a day. · Eat plenty of fruits (such as bananas and oranges), vegetables, legumes, whole grains, and low-fat dairy products. · Lower the amount of saturated fat in your diet. Saturated fat is found in animal products such as milk, cheese, and meat. Limiting these foods may help you lose weight and also lower your risk for heart disease. · Do not smoke. Smoking increases your risk for heart attack and stroke. If you need help quitting, talk to your doctor about stop-smoking programs and medicines. These can increase your chances of quitting for good. When should you call for help? Call 911  anytime you think you may need emergency care. This may mean having symptoms that suggest that your blood pressure is causing a serious heart or blood vessel problem. Your blood pressure may be over 180/120. For example, call 911 if:    · You have symptoms of a heart attack. These may include:  ? Chest pain or pressure, or a strange feeling in the chest.  ? Sweating. ?  Shortness of breath. ? Nausea or vomiting. ? Pain, pressure, or a strange feeling in the back, neck, jaw, or upper belly or in one or both shoulders or arms. ? Lightheadedness or sudden weakness. ? A fast or irregular heartbeat.     · You have symptoms of a stroke. These may include:  ? Sudden numbness, tingling, weakness, or loss of movement in your face, arm, or leg, especially on only one side of your body. ? Sudden vision changes. ? Sudden trouble speaking. ? Sudden confusion or trouble understanding simple statements. ? Sudden problems with walking or balance. ? A sudden, severe headache that is different from past headaches.     · You have severe back or belly pain. Do not wait until your blood pressure comes down on its own. Get help right away. Call your doctor now or seek immediate care if:    · Your blood pressure is much higher than normal (such as 180/120 or higher), but you don't have symptoms.     · You think high blood pressure is causing symptoms, such as:  ? Severe headache.  ? Blurry vision. Watch closely for changes in your health, and be sure to contact your doctor if:    · Your blood pressure measures higher than your doctor recommends at least 2 times. That means the top number is higher or the bottom number is higher, or both.     · You think you may be having side effects from your blood pressure medicine. Where can you learn more? Go to https://TellmeGenpeshelleyewEarshot.StowThat. org and sign in to your [employer] account. Enter G844 in the [facility] box to learn more about \"High Blood Pressure: Care Instructions. \"     If you do not have an account, please click on the \"Sign Up Now\" link. Current as of: April 29, 2021               Content Version: 13.1  © 1864-1930 [employer]. Care instructions adapted under license by [facility] ([facility]).  If you have questions about a medical condition or this instruction, always ask your healthcare professional. Luz Maria Cherry disclaims any warranty or liability for your use of this information. Patient Education        Low Sodium Diet (2,000 Milligram): Care Instructions  Overview     Limiting sodium can be an important part of managing some health problems. The most common source of sodium is salt. People get most of the salt in their diet from canned, prepared, and packaged foods. Fast food and restaurant meals also are very high in sodium. Your doctor will probably limit your sodium to less than 2,000 milligrams (mg) a day. This limit counts all the sodium in prepared and packaged foods and any salt you add to your food. Follow-up care is a key part of your treatment and safety. Be sure to make and go to all appointments, and call your doctor if you are having problems. It's also a good idea to know your test results and keep a list of the medicines you take. How can you care for yourself at home? Read food labels  · Read labels on cans and food packages. The labels tell you how much sodium is in each serving. Make sure that you look at the serving size. If you eat more than the serving size, you have eaten more sodium. · Food labels also tell you the Percent Daily Value for sodium. Choose products with low Percent Daily Values for sodium. · Be aware that sodium can come in forms other than salt, including monosodium glutamate (MSG), sodium citrate, and sodium bicarbonate (baking soda). MSG is often added to Asian food. When you eat out, you can sometimes ask for food without MSG or added salt. Buy low-sodium foods  · Buy foods that are labeled \"unsalted\" (no salt added), \"sodium-free\" (less than 5 mg of sodium per serving), or \"low-sodium\" (140 mg or less of sodium per serving). Foods labeled \"reduced-sodium\" and \"light sodium\" may still have too much sodium. Be sure to read the label to see how much sodium you are getting. · Buy fresh vegetables, or frozen vegetables without added sauces.  Buy low-sodium versions of canned vegetables, soups, and other canned goods. Prepare low-sodium meals  · Cut back on the amount of salt you use in cooking. This will help you adjust to the taste. Do not add salt after cooking. One teaspoon of salt has about 2,300 mg of sodium. · Take the salt shaker off the table. · Flavor your food with garlic, lemon juice, onion, vinegar, herbs, and spices. Do not use soy sauce, lite soy sauce, steak sauce, onion salt, garlic salt, celery salt, or ketchup on your food. · Use low-sodium salad dressings, sauces, and ketchup. Or make your own salad dressings and sauces without adding salt. · Use less salt (or none) when recipes call for it. You can often use half the salt a recipe calls for without losing flavor. Other foods such as rice, pasta, and grains do not need added salt. · Rinse canned vegetables, and cook them in fresh water. This removes somebut not allof the salt. · Avoid water that is naturally high in sodium or that has been treated with water softeners, which add sodium. If you buy bottled water, read the label and choose a sodium-free brand. Avoid high-sodium foods  · Avoid eating:  ? Smoked, cured, salted, and canned meat, fish, and poultry. ? Ham, velazquez, hot dogs, and luncheon meats. ? Regular, hard, and processed cheese and regular peanut butter. ? Crackers with salted tops, and other salted snack foods such as pretzels, chips, and salted popcorn. ? Frozen prepared meals, unless labeled low-sodium. ? Canned and dried soups, broths, and bouillon, unless labeled sodium-free or low-sodium. ? Canned vegetables, unless labeled sodium-free or low-sodium. ? [place] fries, pizza, tacos, and other fast foods. ? Pickles, olives, ketchup, and other condiments, especially soy sauce, unless labeled sodium-free or low-sodium. Where can you learn more? Go to https://vic.Lupatech. org and sign in to your MyChart account.  Enter C806 in the [facility] box to learn more about \"Low Sodium Diet (2,000 Milligram): Care Instructions. \"     If you do not have an account, please click on the \"Sign Up Now\" link. Current as of: September 8, 2021               Content Version: 13.1  © 2006-2021 Healthwise, Incorporated. Care instructions adapted under license by [facility] ([facility]). If you have questions about a medical condition or this instruction, always ask your healthcare professional. [address] any warranty or liability for your use of this information. Patient Education        Learning About Cutting Calories  How do calories affect your weight? Food gives your body energy. Energy from the food you eat is measured in calories. This energy keeps your heart beating, your brain active, and your muscles working. Your body needs a certain number of calories each day. After your body uses the calories it needs, it stores extra calories as fat. To lose weight safely, you have to eat fewer calories while eating in a healthy way. How many calories do you need each day? The more active you are, the more calories you need. When you are less active, you need fewer calories. How many calories you need each day also depends on several things, including your age and whether you are male or female. Here are some general guidelines for adults:  · Less active women and older adults need 1,600 to 2,000 calories each day. · Active women and less active men need 2,000 to 2,400 calories each day. · Active men need 2,400 to 3,000 calories each day. How can you cut calories and eat healthy meals? Whole grains, vegetables and fruits, and dried beans are good lower-calorie foods. They give you lots of nutrients and fiber. And they fill you up. Sweets, energy drinks, and soda pop are high in calories. They give you few nutrients and no fiber. Try to limit soda pop, fruit juice, and energy drinks. Drink water instead. Some fats can be part of a healthy diet.  But cutting back on fats from highly processed foods like fast foods and many snack foods is a good way to lower the calories in your diet. Also, use smaller amounts of fats like butter, margarine, salad dressing, and mayonnaise. Add fresh garlic, lemon, or herbs to your meals to add flavor without adding fat. Meats and dairy products can be a big source of hidden fats. Try to choose lean or low-fat versions of these products. Fat-free cookies, candies, chips, and frozen treats can still be high in sugar and calories. Some fat-free foods have more calories than regular ones. Eat fat-free treats in moderation, as you would other foods. If your favorite foods are high in fat, salt, sugar, or calories, limit how often you eat them. Eat smaller servings, or look for healthy substitutes. Fill up on fruits, vegetables, and whole grains. Eating at home  · Use meat as a side dish instead of as the main part of your meal.  · Try main dishes that use whole wheat pasta, brown rice, dried beans, or vegetables. · Find ways to cook with little or no fat, such as broiling, steaming, or grilling. · Use cooking spray instead of oil. If you use oil, use a monounsaturated oil, such as canola or olive oil. · Trim fat from meats before you cook them. · Drain off fat after you brown the meat or while you roast it. · Chill soups and stews after you cook them. Then skim the fat off the top after it hardens. Eating out  · Order foods that are broiled or poached rather than fried or breaded. · Cut back on the amount of butter or margarine that you use on bread. · Order sauces, gravies, and salad dressings on the side, and use only a little. · When you order pasta, choose tomato sauce rather than cream sauce. · Ask for salsa with your baked potato instead of sour cream, butter, cheese, or velazquez. · Order meals in a small size instead of upgrading to a large.   · Share an entree, or take part of your food home to eat as another meal.  · Share appetizers and desserts. Where can you learn more? Go to https://chpepiceweb.eShakti.com. org and sign in to your [employer] account. Enter L223 in the [facility] box to learn more about \"Learning About Cutting Calories. \"     If you do not have an account, please click on the \"Sign Up Now\" link. Current as of: September 8, 2021               Content Version: 13.1  © 2006-2021 Healthwise, Incorporated. Care instructions adapted under license by [facility] ([facility]). If you have questions about a medical condition or this instruction, always ask your healthcare professional. [address] any warranty or liability for your use of this information. Patient Education        Learning About Low-Carbohydrate Diets  What is a low-carbohydrate diet? A low-carbohydrate (or \"low-carb\") diet limits foods and drinks that have carbohydrates. This includes grains, fruits, milk and yogurt, and starchy vegetables like potatoes, beans, and corn. It also avoids foods and drinks that have added sugar. Instead, low-carb diets include foods that are high in protein and fat. Why might you follow a low-carb diet? Low-carb diets may be used for a variety of reasons, such as for weight loss. People who have diabetes may use a low-carb diet to help manage their blood sugar levels. What should you do before you start the diet? Talk to your doctor before you try any diet. This is even more important if you have health problems like kidney disease, heart disease, or diabetes. Your doctor may suggest that you meet with a registered dietitian. A dietitian can help you make an eating plan that works for you. What foods do you eat on a low-carb diet? On a low-carb diet, you choose foods that are high in protein and fat. Examples of these are:  · Meat, poultry, and fish. · Eggs. · Nuts, such as walnuts, pecans, almonds, and peanuts.   · Peanut butter and other nut butters. · Tofu. · Avocado. · Rhoderick Cristina. · Non-starchy vegetables like broccoli, cauliflower, green beans, mushrooms, peppers, lettuce, and spinach. · Unsweetened non-dairy milks like almond milk and coconut milk. · Cheese, cottage cheese, and cream cheese. Current as of: September 8, 2021               Content Version: 13.1  © 2006-2021 Healthwise, [facility]. Care instructions adapted under license by [facility] ([facility]). If you have questions about a medical condition or this instruction, always ask your healthcare professional. [place] [address] any warranty or liability for your use of this information.

## 2022-03-14 NOTE — PROGRESS NOTES
Kushal Ray (:  1965) is a 61 y.o. female,Established patient, here for evaluation of the following chief complaint(s):  Check-Up         ASSESSMENT/PLAN:  1. Essential hypertension  -     losartan (COZAAR) 25 MG tablet; Take 1 tablet by mouth daily, Disp-30 tablet, R-0Normal  -     Basic Metabolic Panel; Future  2. BMI 33.0-33.9,adult    Plan   Stop lisinopril 10mg. Switch to losartan 25mg daily. Continue to monitor blood pressures at home. Monitor for any adverse sided effects including face/tongue/ throat swelling. Continue to work on weight loss. Low carb/fat diet. Continue with low salt diet  Try to get regular exercise (30mins x5 days a week). Return in about 4 weeks (around 2022) for Annual Wellness Visit + BP. Subjective   SUBJECTIVE/OBJECTIVE:  HPI    Here for follow up    HTN  BP today was 128/80. BP at home running 115-129/70-80s w/ HR 60s-70s. Currently on lisinopril 5mg daily. Tolerating well, albeit having a cough, which she didn't have before taking. Cough started 2nd week on it. Daily exercise here and there, takes care of mother. Compliant with low salt diet trying to cut back. Drinking more water. Denies chest pain, SOB, nausea, vomiting, headaches, fatigue, palpitations. BMI 32.95  Lost 0 lbs since last visit. Doing watching diet. Eating more fruits and vegetables. Low carb/fat diet \"still eats chocolate every day\". Is/Not interested in weight management solutions at this time. HM:   Colonoscopy: declining at this time. Mammogram 21- negative- repeat 2022  Cervical CA screening: Does not follows with anyone for GYN care. Last PAP was 23 years ago and negative. Declining to do it again. COVID 19 pfizer  and         Review of Systems   Constitutional: Negative for fatigue and fever. HENT: Negative for congestion. Eyes: Negative for visual disturbance. Respiratory: Positive for cough. Negative for shortness of breath and wheezing. Cardiovascular: Negative for chest pain and palpitations. Gastrointestinal: Negative for abdominal pain, constipation, diarrhea, nausea and vomiting. Genitourinary: Negative for frequency, hematuria and urgency. Skin: Negative for rash and wound. Neurological: Negative for weakness and numbness. Psychiatric/Behavioral: Negative for agitation and confusion. Objective   Physical Exam  Vitals and nursing note reviewed. Constitutional:       General: She is not in acute distress. Appearance: Normal appearance. She is not ill-appearing. HENT:      Head: Normocephalic and atraumatic. Right Ear: External ear normal.      Left Ear: External ear normal.      Mouth/Throat:      Mouth: Mucous membranes are moist.      Pharynx: No oropharyngeal exudate or posterior oropharyngeal erythema. Eyes:      Extraocular Movements: Extraocular movements intact. Conjunctiva/sclera: Conjunctivae normal.      Pupils: Pupils are equal, round, and reactive to light. Cardiovascular:      Rate and Rhythm: Normal rate and regular rhythm. Pulses: Normal pulses. Heart sounds: No murmur heard. Pulmonary:      Effort: Pulmonary effort is normal. No respiratory distress. Breath sounds: Normal breath sounds. No wheezing. Abdominal:      General: Abdomen is flat. There is no distension. Palpations: Abdomen is soft. Tenderness: There is no abdominal tenderness. Musculoskeletal:         General: Normal range of motion. Cervical back: Normal range of motion and neck supple. No muscular tenderness. Right lower leg: No edema. Left lower leg: No edema. Skin:     General: Skin is warm and dry. Capillary Refill: Capillary refill takes less than 2 seconds. Findings: No rash. Neurological:      General: No focal deficit present. Mental Status: She is alert and oriented to person, place, and time. Sensory: No sensory deficit.       Gait: Gait normal. Psychiatric:         Mood and Affect: Mood normal.         Behavior: Behavior normal.              An electronic signature was used to authenticate this note.     --Blayne Quintero MD

## 2022-04-19 ENCOUNTER — OFFICE VISIT (OUTPATIENT)
Dept: FAMILY MEDICINE CLINIC | Age: 57
End: 2022-04-19
Payer: COMMERCIAL

## 2022-04-19 VITALS
HEIGHT: 63 IN | TEMPERATURE: 97.2 F | BODY MASS INDEX: 33.77 KG/M2 | HEART RATE: 74 BPM | SYSTOLIC BLOOD PRESSURE: 124 MMHG | DIASTOLIC BLOOD PRESSURE: 80 MMHG | WEIGHT: 190.6 LBS | RESPIRATION RATE: 14 BRPM | OXYGEN SATURATION: 98 %

## 2022-04-19 DIAGNOSIS — I10 ESSENTIAL HYPERTENSION: ICD-10-CM

## 2022-04-19 DIAGNOSIS — E66.09 CLASS 1 OBESITY DUE TO EXCESS CALORIES WITH SERIOUS COMORBIDITY AND BODY MASS INDEX (BMI) OF 33.0 TO 33.9 IN ADULT: ICD-10-CM

## 2022-04-19 DIAGNOSIS — Z00.00 WELLNESS EXAMINATION: Primary | ICD-10-CM

## 2022-04-19 PROCEDURE — 99396 PREV VISIT EST AGE 40-64: CPT | Performed by: STUDENT IN AN ORGANIZED HEALTH CARE EDUCATION/TRAINING PROGRAM

## 2022-04-19 ASSESSMENT — PATIENT HEALTH QUESTIONNAIRE - PHQ9
SUM OF ALL RESPONSES TO PHQ9 QUESTIONS 1 & 2: 0
1. LITTLE INTEREST OR PLEASURE IN DOING THINGS: 0
SUM OF ALL RESPONSES TO PHQ QUESTIONS 1-9: 0
2. FEELING DOWN, DEPRESSED OR HOPELESS: 0

## 2022-04-19 ASSESSMENT — LIFESTYLE VARIABLES
HOW OFTEN DO YOU HAVE A DRINK CONTAINING ALCOHOL: NEVER
HOW MANY STANDARD DRINKS CONTAINING ALCOHOL DO YOU HAVE ON A TYPICAL DAY: 1 OR 2

## 2022-04-19 NOTE — PROGRESS NOTES
S: 61 y.o. female with   Chief Complaint   Patient presents with    Hypertension    Annual Exam       HPI: please see resident note for HPI and ROS. Here for wellness visit, doing well    BP Readings from Last 3 Encounters:   04/19/22 124/80   03/14/22 128/80   12/08/21 (!) 142/72     Wt Readings from Last 3 Encounters:   04/19/22 190 lb 9.6 oz (86.5 kg)   03/14/22 186 lb (84.4 kg)   01/19/22 186 lb (84.4 kg)       O: VS:  height is 5' 3\" (1.6 m) and weight is 190 lb 9.6 oz (86.5 kg). Her skin temperature is 97.2 °F (36.2 °C). Her blood pressure is 124/80 and her pulse is 74. Her respiration is 14 and oxygen saturation is 98%. Diagnosis Orders   1. Wellness examination     2. Essential hypertension     3.  Class 1 obesity due to excess calories with serious comorbidity and body mass index (BMI) of 33.0 to 33.9 in adult         Plan:  Lifestyle modifications for weight and HTN  Continue to monitor blood pressures  F/u in 1 year for wellness    Health Maintenance Due   Topic Date Due    Cervical cancer screen  Never done    Colorectal Cancer Screen  Never done    Shingles Vaccine (1 of 2) Never done         Alissa Mcgill MD 4/21/2022 10:45 AM

## 2022-04-19 NOTE — PROGRESS NOTES
Annual Wellness Visit    Arina Knox is here for  AWV and Hypertension    Assessment & Plan   Wellness examination  Essential hypertension  Class 1 obesity due to excess calories with serious comorbidity and body mass index (BMI) of 33.0 to 33.9 in adult      Recommendations for Preventive Services Due: see orders and patient instructions/AVS.  Recommended screening schedule for the next 5-10 years is provided to the patient in written form: see Patient Instructions/AVS.  Ok to D/c BP meds. Continue with lifestyle modifications. Monitor BP at home  Continue work on diet and exercise. Low carb low salt diet. Ohmx Reinaldo to monitor calorie intake  Use elements from both DASH and Mediterranean diets      Return for Annual Wellness Visit in 1 year. Subjective   The following acute and/or chronic problems were also addressed today:    HTN  Stable and controlled. Stopped her medications. Does have a blood pressure log w/ Bps ranging from 115s-125/070s-80s. No chest pain or SOB. Obesity  Eating more fruits and nuts. Trying to eat healthy. Does still drink sugary drinks and chips. Considering exercise routine. HM  Previously refused. No PAP in 20+ years. Has gotten COVID vaccines. Mammogram was normal in 11/11/20. Due for shingles- considering getting it from her pharmacy. Patient's complete Health Risk Assessment and screening values have been reviewed and are found in Flowsheets. The following problems were reviewed today and where indicated follow up appointments were made and/or referrals ordered.     Positive Risk Factor Screenings with Interventions:               General Health and ACP:  General  In general, how would you say your health is?: Good  In the past 7 days, have you experienced any of the following: New or Increased Pain, New or Increased Fatigue, Loneliness, Social Isolation, Stress or Anger?: (!) Yes  Select all that apply: Herve Flowers  Do you get the social and emotional support that you need?: Yes  Do you have a Living Will?: (!) No    Advance Directives     Power of  Living Will ACP-Advance Directive ACP-Power of     Not on File Not on File Not on File Not on File      General Health Risk Interventions:  · Pain issues:  none  · Fatigue: none  · Loneliness: lives with mother and sister    Health Habits/Nutrition:     Physical Activity: Insufficiently Active    Days of Exercise per Week: 5 days    Minutes of Exercise per Session: 20 min     Have you lost any weight without trying in the past 3 months?: No  Body mass index: (!) 33.76  Have you seen the dentist within the past year?: Yes    Health Habits/Nutrition Interventions:  · Working on diet and exercise    Hearing/Vision:  Do you or your family notice any trouble with your hearing that hasn't been managed with hearing aids?: No  Do you have difficulty driving, watching TV, or doing any of your daily activities because of your eyesight?: No  Have you had an eye exam within the past year?: (!) No (eye doctor every 2 years)  No exam data present    Hearing/Vision Interventions:  · Hearing concerns:  none     Safety:  Do you have working smoke detectors?: Yes  Do you have any tripping hazards - loose or unsecured carpets or rugs?: No  Do you have any tripping hazards - clutter in doorways, halls, or stairs?: No  Do you have either shower bars, grab bars, non-slip mats or non-slip surfaces in your shower or bathtub?: (!) No (does not use shower or bathtub)  Do all of your stairways have a railing or banister?: (!) No (no stairs in home)  Do you always fasten your seatbelt when you are in a car?: Yes    Safety Interventions:  · Home safety tips provided           Objective   Vitals:    04/19/22 1008   BP: 124/80   Site: Left Upper Arm   Position: Sitting   Cuff Size: Medium Adult   Pulse: 74   Resp: 14   Temp: 97.2 °F (36.2 °C)   TempSrc: Skin   SpO2: 98%   Weight: 190 lb 9.6 oz (86.5 kg)   Height: 5' 3\" (1.6 m)      Body mass index is 33.76 kg/m². General Appearance: alert and oriented to person, place and time, well developed and well- nourished, in no acute distress  Skin: warm and dry, or erythema. Dry, flaky circular patch of skin behind right ear  Head: normocephalic and atraumatic  Eyes: pupils equal, round, and reactive to light, extraocular eye movements intact, conjunctivae normal  ENT: tympanic membrane, external ear and ear canal normal bilaterally, nose without deformity, nasal mucosa and turbinates normal without polyps  Neck: supple and non-tender without mass, no thyromegaly or thyroid nodules, no cervical lymphadenopathy  Pulmonary/Chest: clear to auscultation bilaterally- no wheezes, rales or rhonchi, normal air movement, no respiratory distress  Cardiovascular: normal rate, regular rhythm, normal S1 and S2, no murmurs, rubs, clicks, or gallops, distal pulses intact, no carotid bruits  Abdomen: soft, non-tender, non-distended, normal bowel sounds, no masses or organomegaly  Extremities: no cyanosis, clubbing or edema  Musculoskeletal: normal range of motion, no joint swelling, deformity or tenderness  Neurologic: reflexes normal and symmetric, no cranial nerve deficit, gait, coordination and speech normal       Allergies   Allergen Reactions    Latex Shortness Of Breath    Penicillins Anaphylaxis    Other Itching     Bleach in bedding, itching  Unable to swallow large pills per patient    Sulfa Antibiotics      \" unsure\"    Tetanus Toxoids      Prior to Visit Medications    Medication Sig Taking? Authorizing Provider   losartan (COZAAR) 25 MG tablet Take 1 tablet by mouth daily Yes Louise Major MD   Ascorbic Acid (VITAMIN C) 250 MG tablet Take 125 mg by mouth daily Yes Historical Provider, MD   Blood Pressure Monitor KIT Check blood pressures twice a day. Sit and relax for 30mins prior to checking. Make sure arm is at heart level when checking.  Yes Louise Major MD   Calcium Carbonate-Vitamin D (CALTRATE 600+D) 600-400 MG-UNIT CHEW Take 1 tablet by mouth daily. Yes Historical Provider, MD [employer] (Including outside providers/suppliers regularly involved in providing care):   Patient Care Team:  Edel Way MD as PCP - [facility])    Reviewed and updated this visit:  Tobacco  Allergies  Meds  Med Hx  Surg Hx  Soc Hx  Fam Hx                      Attending Physician Statement  I have discussed the case, including pertinent history and exam findings with the resident. I agree with the documented assessment and plan as documented by the resident.   GE modifier added to this encounter      Taisha Garza MD 4/22/2022 1:40 PM

## 2022-04-19 NOTE — PROGRESS NOTES
Health Maintenance Due   Topic Date Due    Cervical cancer screen  Never done    Colorectal Cancer Screen  Never done    Shingles Vaccine (1 of 2) Never done

## 2022-04-19 NOTE — PATIENT INSTRUCTIONS
Continue to work on diet and exercise (biking)  Use elements from both DASH and Mediterranean diets   Continue blood pressure. Ok with stopping BP meds   Use the Trekea sofia to monitor calories   See you back in 2 months. Patient Education        Learning About the Mediterranean Diet  What is the [address]? The Mediterranean diet is a style of eating rather than a diet plan. It features foods eaten in [place], [place], [place] and [place], and other countries along the [place]. It emphasizes eating foods like fish, fruits, vegetables, beans, high-fiber breads and whole grains, nuts, and oliveoil. This style of eating includes limited red meat, cheese, and sweets. Why choose the Mediterranean diet? A Mediterranean-style diet may improve heart health. It contains more fat than other heart-healthy diets. But the fats are mainly from nuts, unsaturated oils (such as fish oils and olive oil), and certain nut or seed oils (such as canola, soybean, or flaxseed oil). These fats may help protect the heart andblood vessels. How can you get started on the Mediterranean diet? Here are some things you can do to switch to a more Mediterranean way of eating. What to eat   Eat a variety of fruits and vegetables each day, such as grapes, blueberries, tomatoes, broccoli, peppers, figs, olives, spinach, eggplant, beans, lentils, and chickpeas.  Eat a variety of whole-grain foods each day, such as oats, brown rice, and whole wheat bread, pasta, and couscous.  Eat fish at least 2 times a week. Try tuna, salmon, mackerel, lake trout, herring, or sardines.  Eat moderate amounts of low-fat dairy products, such as milk, cheese, or yogurt.  Eat moderate amounts of poultry and eggs.  Choose healthy (unsaturated) fats, such as nuts, olive oil, and certain nut or seed oils like canola, soybean, and flaxseed.  Limit unhealthy (saturated) fats, such as butter, palm oil, and coconut oil.  And limit fats found in animal products, such as meat and dairy products made with whole milk. Try to eat red meat only a few times a month in very small amounts.  Limit sweets and desserts to only a few times a week. This includes sugar-sweetened drinks like soda. The Mediterranean diet may also include red wine with your meal1 glass eachday for women and up to 2 glasses a day for men. Tips for eating at home   Use herbs, spices, garlic, lemon zest, and citrus juice instead of salt to add flavor to foods.  Add avocado slices to your sandwich instead of velazquez.  Have fish for lunch or dinner instead of red meat. Brush the fish with olive oil, and broil or grill it.  Sprinkle your salad with seeds or nuts instead of cheese. Terisa Sancho with olive or canola oil instead of butter or oils that are high in saturated fat.  Switch from 2% milk or whole milk to 1% or fat-free milk.  Dip raw vegetables in a vinaigrette dressing or hummus instead of dips made from mayonnaise or sour cream.   Have a piece of fruit for dessert instead of a piece of cake. Try baked apples, or have some dried fruit. Tips for eating out   Try broiled, grilled, baked, or poached fish instead of having it fried or breaded.  Ask your  to have your meals prepared with olive oil instead of butter.  Order dishes made with marinara sauce or sauces made from olive oil. Avoid sauces made from cream or mayonnaise.  Choose whole-grain breads, whole wheat pasta and pizza crust, brown rice, beans, and lentils.  Cut back on butter or margarine on bread. Instead, you can dip your bread in a small amount of olive oil.  Ask for a side salad or grilled vegetables instead of french fries or chips. Where can you learn more? Go to https://vic.healthWhatsOpen. org and sign in to your flck.me account. Enter [phone] in the [facility] box to learn more about \"Learning About the Mediterranean Diet. \"     If you do not have an account, please click on the \"Sign Up Now\" link. Current as of: September 8, 2021               Content Version: 13.2  © 2006-2022 [employer]. Care instructions adapted under license by [employer]. If you have questions about a medical condition or this instruction, always ask your healthcare professional. [address] any warranty or liability for your use of this information. Personalized Preventive Plan for Gerardine Klinefelter - 4/19/2022  Medicare offers a range of preventive health benefits. Some of the tests and screenings are paid in full while other may be subject to a deductible, co-insurance, and/or copay. Some of these benefits include a comprehensive review of your medical history including lifestyle, illnesses that may run in your family, and various assessments and screenings as appropriate. After reviewing your medical record and screening and assessments performed today your provider may have ordered immunizations, labs, imaging, and/or referrals for you. A list of these orders (if applicable) as well as your Preventive Care list are included within your After Visit Summary for your review. Other Preventive Recommendations:    · A preventive eye exam performed by an eye specialist is recommended every 1-2 years to screen for glaucoma; cataracts, macular degeneration, and other eye disorders. · A preventive dental visit is recommended every 6 months. · Try to get at least 150 minutes of exercise per week or 10,000 steps per day on a pedometer . · Order or download the FREE \"Exercise & Physical Activity: Your Everyday Guide\" from [employer]. Call 4-290.415.6224 or search [employer] online. · You need 5618-5492 mg of calcium and 2893-0910 IU of vitamin D per day.  It is possible to meet your calcium requirement with diet alone, but a vitamin D supplement is usually necessary to meet this goal.  · When exposed to the sun, use a sunscreen that protects against both UVA and UVB radiation with an SPF of 30 or greater. Reapply every 2 to 3 hours or after sweating, drying off with a towel, or swimming. · Always wear a seat belt when traveling in a car. Always wear a helmet when riding a bicycle or motorcycle.

## 2022-04-26 NOTE — PROGRESS NOTES
Attending Physician Statement  I have discussed the case, including pertinent history and exam findings with the resident. I agree with the documented assessment and plan as documented by the resident.   GE modifier added to this encounter      Carlos Kearns MD 4/26/2022 9:11 AM

## 2022-06-21 ENCOUNTER — OFFICE VISIT (OUTPATIENT)
Dept: FAMILY MEDICINE CLINIC | Age: 57
End: 2022-06-21
Payer: COMMERCIAL

## 2022-06-21 VITALS
HEIGHT: 63 IN | RESPIRATION RATE: 16 BRPM | WEIGHT: 188.6 LBS | TEMPERATURE: 97.1 F | DIASTOLIC BLOOD PRESSURE: 70 MMHG | HEART RATE: 68 BPM | SYSTOLIC BLOOD PRESSURE: 126 MMHG | OXYGEN SATURATION: 99 % | BODY MASS INDEX: 33.42 KG/M2

## 2022-06-21 DIAGNOSIS — L98.9 SKIN LESION: Primary | ICD-10-CM

## 2022-06-21 DIAGNOSIS — I10 ESSENTIAL HYPERTENSION: ICD-10-CM

## 2022-06-21 PROCEDURE — G8427 DOCREV CUR MEDS BY ELIG CLIN: HCPCS | Performed by: STUDENT IN AN ORGANIZED HEALTH CARE EDUCATION/TRAINING PROGRAM

## 2022-06-21 PROCEDURE — 3017F COLORECTAL CA SCREEN DOC REV: CPT | Performed by: STUDENT IN AN ORGANIZED HEALTH CARE EDUCATION/TRAINING PROGRAM

## 2022-06-21 PROCEDURE — G8417 CALC BMI ABV UP PARAM F/U: HCPCS | Performed by: STUDENT IN AN ORGANIZED HEALTH CARE EDUCATION/TRAINING PROGRAM

## 2022-06-21 PROCEDURE — 99213 OFFICE O/P EST LOW 20 MIN: CPT | Performed by: STUDENT IN AN ORGANIZED HEALTH CARE EDUCATION/TRAINING PROGRAM

## 2022-06-21 PROCEDURE — 1036F TOBACCO NON-USER: CPT | Performed by: STUDENT IN AN ORGANIZED HEALTH CARE EDUCATION/TRAINING PROGRAM

## 2022-06-21 RX ORDER — TRIAMCINOLONE ACETONIDE 5 MG/G
CREAM TOPICAL
Qty: 15 G | Refills: 1 | Status: SHIPPED | OUTPATIENT
Start: 2022-06-21 | End: 2022-06-28

## 2022-06-21 ASSESSMENT — ENCOUNTER SYMPTOMS
WHEEZING: 0
SHORTNESS OF BREATH: 0
ABDOMINAL PAIN: 0
DIARRHEA: 0
NAUSEA: 0
CONSTIPATION: 0
COUGH: 0
VOMITING: 0

## 2022-06-21 NOTE — PROGRESS NOTES
S: 61 y.o. female with   Chief Complaint   Patient presents with    Follow-up       Chief complaint, [place], and all pertinent details of the case reviewed with the resident. Please see resident's note for specific details discussed at today's visit. Itchy spot behind ear for 4 years. Has only tried moisuturizer. Working on lifestyle changes for BP, feeling less stress    BP Readings from Last 3 Encounters:   06/21/22 126/70   04/19/22 124/80   03/14/22 128/80     Wt Readings from Last 3 Encounters:   06/21/22 188 lb 9.6 oz (85.5 kg)   04/19/22 190 lb 9.6 oz (86.5 kg)   03/14/22 186 lb (84.4 kg)       O: VS:  height is 5' 3\" (1.6 m) and weight is 188 lb 9.6 oz (85.5 kg). Her temperature is 97.1 °F (36.2 °C). Her blood pressure is 126/70 and her pulse is 68. Her respiration is 16 and oxygen saturation is 99%. AAO/NAD, appropriate affect for mood  Skin:  Behind rt ear as scaly plaque on erythematous base, normal behind left ear     Diagnosis Orders   1. Skin lesion     2. Essential hypertension         Plan:  Triamcinolone for postauricular rash/eczema - inflammatory- could be worsened by habitual itching  Benadryl at night for 2 weeks to calm down her urge to itch it  F/u here if not improving, could also consider fungal treatment  Can stop losartan as BP has been ok off of it at home and here. But will recheck    Health Maintenance Due   Topic Date Due    Cervical cancer screen  Never done    Colorectal Cancer Screen  Never done    Shingles vaccine (1 of 2) Never done       Attending Physician Statement  I have discussed the case, including pertinent history and exam findings with the resident. I also have seen the patient and performed key portions of the examination. I agree with the documented assessment and plan as documented by the resident.         Francia Rivera MD 6/21/2022 10:41 AM

## 2022-06-21 NOTE — PROGRESS NOTES
Chente Ceron (:  1965) is a 61 y.o. female,Established patient, here for evaluation of the following chief complaint(s):  Follow-up         ASSESSMENT/PLAN:  1. Skin lesion  -     triamcinolone (ARISTOCORT) 0.5 % cream; Apply topically 3 times daily. , Disp-15 g, R-1, Normal  2. Essential hypertension    Plan  Start triamcinolone cream - apply 3 times daily  Benadryl for itching. Continue with sunscreen  Wide brim hat when outside. Avoid sun exposure to that area. Continue to hold blood pressure medication. Low salt diet, low carb diet, continue with exercise, continue with positive raj practices     Return in about 2 weeks (around 2022) for Skin lesion right ear. Can cancel and see in 4 weeks if improving . Subjective   SUBJECTIVE/OBJECTIVE:  HPI    HTN  Stable. Controlled without medications. Running. 110s-120s/60-70s. Exercising more. Decreased stress. Skin lesion  Dry patch behind right ear. Thinks she has contact dermatits. Been there 4 years. Itchy. Has tried moisturizer cream. No personal or family history of skin cancer. Does use sun screen, spF 50. Review of Systems   Constitutional: Negative for fatigue and fever. HENT: Negative for congestion. Eyes: Negative for visual disturbance. Respiratory: Negative for cough, shortness of breath and wheezing. Cardiovascular: Negative for chest pain and palpitations. Gastrointestinal: Negative for abdominal pain, constipation, diarrhea, nausea and vomiting. Genitourinary: Negative for frequency, hematuria and urgency. Skin: Positive for rash. Negative for wound. Neurological: Negative for weakness and numbness. Psychiatric/Behavioral: Negative for agitation and confusion. Objective   Physical Exam  Vitals and nursing note reviewed. Constitutional:       General: She is not in acute distress. Appearance: Normal appearance. She is not ill-appearing. HENT:      Head: Normocephalic and atraumatic. Right Ear: External ear normal.      Left Ear: External ear normal.   Cardiovascular:      Rate and Rhythm: Normal rate and regular rhythm. Pulses: Normal pulses. Heart sounds: No murmur heard. Pulmonary:      Effort: Pulmonary effort is normal. No respiratory distress. Breath sounds: Normal breath sounds. No wheezing. Abdominal:      General: Abdomen is flat. There is no distension. Palpations: Abdomen is soft. Tenderness: There is no abdominal tenderness. Musculoskeletal:         General: Normal range of motion. Cervical back: Normal range of motion and neck supple. No muscular tenderness. Right lower leg: No edema. Left lower leg: No edema. Skin:     General: Skin is warm and dry. Capillary Refill: Capillary refill takes less than 2 seconds. Findings: Rash ( Scaly, dry, plaque post auricular. See picture. ) present. Neurological:      General: No focal deficit present. Mental Status: She is alert and oriented to person, place, and time. Sensory: No sensory deficit. Gait: Gait normal.   Psychiatric:         Mood and Affect: Mood normal.         Behavior: Behavior normal.                    An electronic signature was used to authenticate this note.     --Ashley Cortez MD

## 2022-06-21 NOTE — PROGRESS NOTES
S: 61 y.o. female with   Chief Complaint   Patient presents with    Follow-up       HPI per Dr. Mack Santamaria. BP Readings from Last 3 Encounters:   06/21/22 126/70   04/19/22 124/80   03/14/22 128/80     Wt Readings from Last 3 Encounters:   06/21/22 188 lb 9.6 oz (85.5 kg)   04/19/22 190 lb 9.6 oz (86.5 kg)   03/14/22 186 lb (84.4 kg)           O: VS:  height is 5' 3\" (1.6 m) and weight is 188 lb 9.6 oz (85.5 kg). Her temperature is 97.1 °F (36.2 °C). Her blood pressure is 126/70 and her pulse is 68. Her respiration is 16 and oxygen saturation is 99%. Diagnosis Orders   1. Skin lesion     2. Essential hypertension         Plan  HTN doing well. Will hold off on adding BP meds today. Continue low salt diet. Skin lesion: possible contact dermatitis vs psoriasis. Will trial triamcinolone cream.     Health Maintenance Due   Topic Date Due    Cervical cancer screen  Never done    Colorectal Cancer Screen  Never done    Shingles vaccine (1 of 2) Never done       I have discussed the case, including pertinent history and exam findings with the resident. I agree with the documented assessment and plan as documented by the resident.         Leo Patton DO 6/21/2022 10:40 AM